# Patient Record
Sex: FEMALE | Race: WHITE | NOT HISPANIC OR LATINO | Employment: FULL TIME | URBAN - METROPOLITAN AREA
[De-identification: names, ages, dates, MRNs, and addresses within clinical notes are randomized per-mention and may not be internally consistent; named-entity substitution may affect disease eponyms.]

---

## 2017-05-16 ENCOUNTER — ALLSCRIPTS OFFICE VISIT (OUTPATIENT)
Dept: OTHER | Facility: OTHER | Age: 42
End: 2017-05-16

## 2017-11-01 DIAGNOSIS — Q61.3 POLYCYSTIC KIDNEY: ICD-10-CM

## 2017-11-02 ENCOUNTER — GENERIC CONVERSION - ENCOUNTER (OUTPATIENT)
Dept: OTHER | Facility: OTHER | Age: 42
End: 2017-11-02

## 2017-12-04 ENCOUNTER — APPOINTMENT (OUTPATIENT)
Dept: RADIOLOGY | Facility: CLINIC | Age: 42
End: 2017-12-04
Payer: COMMERCIAL

## 2017-12-04 ENCOUNTER — TRANSCRIBE ORDERS (OUTPATIENT)
Dept: ADMINISTRATIVE | Facility: HOSPITAL | Age: 42
End: 2017-12-04

## 2017-12-04 ENCOUNTER — APPOINTMENT (OUTPATIENT)
Dept: LAB | Facility: CLINIC | Age: 42
End: 2017-12-04
Payer: COMMERCIAL

## 2017-12-04 ENCOUNTER — TRANSCRIBE ORDERS (OUTPATIENT)
Dept: LAB | Facility: CLINIC | Age: 42
End: 2017-12-04

## 2017-12-04 DIAGNOSIS — N20.0 CALCULUS OF KIDNEY: ICD-10-CM

## 2017-12-04 DIAGNOSIS — Z12.31 VISIT FOR SCREENING MAMMOGRAM: Primary | ICD-10-CM

## 2017-12-04 DIAGNOSIS — Q61.3 POLYCYSTIC KIDNEY: ICD-10-CM

## 2017-12-04 LAB
ANION GAP SERPL CALCULATED.3IONS-SCNC: 6 MMOL/L (ref 4–13)
BUN SERPL-MCNC: 11 MG/DL (ref 5–25)
CALCIUM SERPL-MCNC: 9 MG/DL (ref 8.3–10.1)
CHLORIDE SERPL-SCNC: 105 MMOL/L (ref 100–108)
CO2 SERPL-SCNC: 27 MMOL/L (ref 21–32)
CREAT SERPL-MCNC: 0.7 MG/DL (ref 0.6–1.3)
GFR SERPL CREATININE-BSD FRML MDRD: 106 ML/MIN/1.73SQ M
GLUCOSE P FAST SERPL-MCNC: 104 MG/DL (ref 65–99)
POTASSIUM SERPL-SCNC: 4.9 MMOL/L (ref 3.5–5.3)
SODIUM SERPL-SCNC: 138 MMOL/L (ref 136–145)

## 2017-12-04 PROCEDURE — 80048 BASIC METABOLIC PNL TOTAL CA: CPT

## 2017-12-04 PROCEDURE — 36415 COLL VENOUS BLD VENIPUNCTURE: CPT

## 2017-12-04 PROCEDURE — 74000 HB X-RAY EXAM OF ABDOMEN (SINGLE ANTEROPOSTERIOR VIEW): CPT

## 2017-12-08 ENCOUNTER — GENERIC CONVERSION - ENCOUNTER (OUTPATIENT)
Dept: OTHER | Facility: OTHER | Age: 42
End: 2017-12-08

## 2017-12-08 ENCOUNTER — HOSPITAL ENCOUNTER (OUTPATIENT)
Dept: MAMMOGRAPHY | Facility: HOSPITAL | Age: 42
Discharge: HOME | End: 2017-12-08
Attending: NURSE PRACTITIONER
Payer: COMMERCIAL

## 2017-12-08 DIAGNOSIS — Z12.31 VISIT FOR SCREENING MAMMOGRAM: ICD-10-CM

## 2017-12-08 PROCEDURE — G0202 SCR MAMMO BI INCL CAD: HCPCS

## 2017-12-19 ENCOUNTER — ALLSCRIPTS OFFICE VISIT (OUTPATIENT)
Dept: OTHER | Facility: OTHER | Age: 42
End: 2017-12-19

## 2017-12-19 ENCOUNTER — LAB REQUISITION (OUTPATIENT)
Dept: LAB | Facility: HOSPITAL | Age: 42
End: 2017-12-19
Payer: COMMERCIAL

## 2017-12-19 DIAGNOSIS — Z01.419 ENCOUNTER FOR GYNECOLOGICAL EXAMINATION WITHOUT ABNORMAL FINDING: ICD-10-CM

## 2017-12-19 DIAGNOSIS — R92.8 OTHER ABNORMAL AND INCONCLUSIVE FINDINGS ON DIAGNOSTIC IMAGING OF BREAST: ICD-10-CM

## 2017-12-19 PROCEDURE — 87624 HPV HI-RISK TYP POOLED RSLT: CPT | Performed by: NURSE PRACTITIONER

## 2017-12-19 PROCEDURE — G0145 SCR C/V CYTO,THINLAYER,RESCR: HCPCS | Performed by: NURSE PRACTITIONER

## 2017-12-20 ENCOUNTER — HOSPITAL ENCOUNTER (OUTPATIENT)
Dept: RADIOLOGY | Facility: HOSPITAL | Age: 42
Discharge: HOME | End: 2017-12-20
Attending: NURSE PRACTITIONER
Payer: COMMERCIAL

## 2017-12-20 DIAGNOSIS — R92.8 OTHER ABNORMAL AND INCONCLUSIVE FINDINGS ON DIAGNOSTIC IMAGING OF BREAST: ICD-10-CM

## 2017-12-20 PROCEDURE — 76642 ULTRASOUND BREAST LIMITED: CPT

## 2017-12-20 NOTE — PROGRESS NOTES
Assessment  1  Nephrolithiasis (592 0) (N20 0)   2  Benign hypertension (401 1) (I10)   3  Polycystic kidney (753 12) (Q61 3)    Plan  Benign hypertension, Polycystic kidney    · (1) LIPID PANEL, FASTING; Status:Active; Requested AIX:[id number]; Perform:[facility]; FJJ:87GVA4063;FNONBCS; For:Benign hypertension, Polycystic kidney; Ordered By:Masoud Medel;  Polycystic kidney    · (1) BASIC METABOLIC PROFILE; Status:Active; Requested XQQ:[id number]; Perform:[facility]; HXP:33KFN1857;TZZJMZB; For:Polycystic kidney; Ordered By:Masoud Medel;   · (1) CBC/PLT/DIFF; Status:Active; Requested IOO:[id number]; Perform:[facility]; JPT:32ZHB2420;MZEJOEQ; For:Polycystic kidney; Ordered By:Masoud Medel;    As we reviewed your labs including year creatinine for the kidney function electrolytes are all normal so there is no evidence of damage to your kidneys for in terms of their function  Your blood pressure is excellent continue current medications  Your x-ray showed that there was just 1 3 mm stone which was present in the past there has been no new stone development  Also the other tiny ones must the past as you recall urinating some gravel  Labs and follow-up as scheduled  Discussion/Summary    The patient has history of polycystic kidneys but her creatinine is actually 0 7 electrolytes are normal so there is no signs of renal dysfunction  Her blood pressure is excellent explained to her that most people with polycystic kidney disease due developed hypertension  She is very careful with what she eats and watches her salt intake  Continue her current ARB at the current dose  For now she has no other sequelae related to polycystic kidney disease except a history of kidney stones  We did do a KUB and it just shows 1 3 mm stone which was present in the past so there has been no new development of stones and she has no symptoms    The past we did do a 24 hour urine collection there were really no identifiable risk factors to modify with treatment so I would only really pursue that if she developed new stones  Next visit I will check a CBC, BMP and lipid profile  Reason For Visit  Your here for follow up to monitor your kidney function and blood pressure and kidney stone prevention  History of Present Illness  The patient is here for follow-up as she has a history of polycystic kidney disease, hypertension and kidney stones  She is really doing well and looks well tolerating her medication she has had no intercurrent illnesses no kidney stone event  Overall she has good energy she is working a little bit of stress at home but no other c      Review of Systems   Constitutional: no fever,-- no chills,-- no anorexia-- and-- no fatigue  Integumentary: no rashes  Gastrointestinal: no abdominal pain,-- no nausea,-- no diarrhea-- and-- no vomiting  Respiratory: no shortness of breath,-- no cough-- and-- not coughing up sputum  Cardiovascular: no orthopnea,-- no chest pain,-- no palpitations-- and-- no lower extremity edema  Musculoskeletal: No complaints of joint pain or swelling  Neurological: No complaints of headache, no lightheadedness or dizziness  Genitourinary: no dysuria,-- no hematuria-- and-- no incomplete emptying of bladder  Eyes: No complaints of eyesight problems or dryness of eyes  ENT: no complaints of hearing loss, no nasal discharge  Current Meds   1  Olmesartan Medoxomil 5 MG Oral Tablet; 2 TABS DAILY; Therapy: 61SVS4829 to (Evaluate:12Nov2017)  Requested for: 76UXP8805; Last Rx:42Yxy9799 Ordered    The medication list was reviewed and updated today  Allergies  1   No Known Drug Allergies    Vitals  Vital Signs    Recorded: 94VWR8808 09:12AM Recorded: 94VZE6336 08:37AM   Heart Rate 70    Respiration 16    Systolic 555    Diastolic 70    Height  5 ft 7 in   Weight  170 lb    BMI Calculated  26 63   BSA Calculated  1 89     Physical Exam Constitutional: General appearance: No acute distress, well appearing and well nourished  ENT: External ears and nose appear normal     Eyes: Anicteric sclerae  JVD:  No JVD present  Pulmonary: Respiratory effort: No increased work of breathing or signs of respiratory distress  -- Auscultation of lungs: Clear to auscultation  Cardiovascular: Auscultation of heart: Normal rate and rhythm, normal S1 and S2, without murmurs  Abdomen: Non-tender, no masses  Extremities: No cyanosis, clubbing or edema  Neurologic: Non Focal     Psychiatric: Orientation to person, place, and time: Normal        Results/Data  * MAMMO SCREENING BILATERAL W CAD [id number] 07:23AM EPIC, Provider     Test Name Result Flag Reference   MAMMO SCREENING BILATERAL W CAD (Report)     Patient History:  No known family history of cancer  Patient has never smoked  Patient's BMI is 25 7  Reason for exam: screening, asymptomatic  Mammo Screening Bilateral W CAD: December 8, 2017 - Check In #:   [de-identified]  Bilateral MLO, CC, and XCCL view(s) were taken  Technologist: NELLA Escalante (NELLA)(M)  No prior studies available for comparison  The breast tissue is extremely dense, potentially limiting the   sensitivity of mammography  Patient risk, included in this   report, assists in determining the appropriate screening regimen   (such as 3-D mammography or the inclusion of automated breast   ultrasound or MRI)  3-D mammography may also remain indicated as   screening  No architectural distortion or suspicious calcifications are   noted in either breast  The skin and nipple contours are within   normal limits  Dominant circumscribed 4 5 cm mass likely   representing cyst is present in the upper outer left breast    Targeted ultrasound recommended for confirmation      Sven Diego BI-RADSï¾® Assessments: BiRad:0 - Incomplete: needs additional   imaging evaluation   A breast health care nurse from our facility will be contacting   the patient regarding the need for additional imaging  Recommendation:  Ultrasound of the left breast   Analyzed by CAD   The patient is scheduled in a reminder system for screening   mammography  8-10% of cancers will be missed on mammography  Management of a   palpable abnormality must be based on clinical grounds  Patients  will be notified of their results via letter from our facility  Accredited by [employer] and FDA  Transcription Location: [facility] [address]: [id number]   Risk Value(s):  Tyrer-Cuzick 10 Year: 1 600%, Tyrer-Cuzick Lifetime: 10 300%,   Myriad Table: 1 5%, MITCH 5 Year: 0 7%, NCI Lifetime: 9 9%  Signed by:  Marga Soulier, MD  12/8/17       Future Appointments    Date/Time Provider Specialty Site   12/19/2017 10:30 AM STEFFANIE Hampton  Obstetrics/Gynecology [address] [facility]     Signatures   Electronically signed by :  BK Johnston ; Dec 19 2017  9:14AM EST                       (Author)

## 2017-12-21 LAB — HPV RRNA GENITAL QL NAA+PROBE: NORMAL

## 2017-12-22 NOTE — PROGRESS NOTES
Assessment   1  Encounter for gynecological examination (V72 31) (Z01 419)   2  Pap smear, as part of routine gynecological examination (V76 2) (Z01 419)   3  Abnormal mammogram of left breast (793 80) (R92 8)   4  Left breast lump (611 72) (N63 20)    Plan   Abnormal mammogram of left breast    · *US BREAST LEFT LIMITED (DIAGNOSTIC); Status:Resulted - Requires Verification;      Done: 52JTX7885 11:25AM   Order Comments:for finding on iaiouxatp69/19/17 Clinical breast exam: + palpable finding for -- 4x3 cm cyst in left breast, smooth and mobile, at 2 oclock; Due:95Cip6677; Ordered; For:Abnormal mammogram of left breast; Ordered By:Mary Ann Macdonald;  Encounter for gynecological examination, Pap smear, as part of routine gynecological    examination    · (1) THIN PREP PAP WITH IMAGING; Status: In Progress - Specimen/Data Collected;      Done: 18MPC2571   Perform:[facility] In Office Collection; Order Comments:Cervical/Endocervical; Due:76Eea4818; Ordered;For:Encounter for gynecological examination, Pap smear, as part of routine gynecological examination; Ordered By:Winnie Macdonald;  Maturation index required? : No  : 12/06/2017  HPV? : Regardless of Interpretation    Discussion/Summary   health maintenance visit healthy adult female Pap test with reflex HPV testing was done today Patient discussion: discussed with the patient  Normal well woman exam   with reflex updated   had screening mammogram, birads 0 on left for cyst  diagnostic US order today  with palpable cystic finding on CBE today-- --4x3 cm, likely c/w findings on mammogram  diagnostic findings then will advise patient further  The patient has the current Goals: Ongoing GYN health  of breast finding on mammo and exam  The patent has the current Barriers: None  Patient is able to Self-Care  The treatment plan was reviewed with the patient/guardian   The patient/guardian understands and agrees with the treatment plan       Self Referrals: No      Chief Complaint   Annual Visit Pt is doing good  would like to discuss mammogram results      History of Present Illness   HPI: Amadou Nichole is a 35 yo NEW PATIENT for yearly gyn exam  is known to me from [facility]  records available for review at this time  pap 2016 normal  hx of abnormal paps are regular condoms; satisfied with method  main concern is her recent mammogram which was done on 17  in left breast requires further evaluation with diagnostic US  GYN , Adult Female [facility]: The patient is being seen for a health maintenance and gynecology evaluation  General Health: The patient's health since the last visit is described as good  Lifestyle:  She does not use tobacco -- She consumes alcohol -- She denies drug use  Reproductive health: the patient is premenopausal--   she reports no menstrual problems  -- she uses contraception  -- she is sexually active  -- condoms  Screening:      Review of Systems   no pelvic pain,-- no vaginal discharge,-- no vaginal odor,-- no nonmenstrual bleeding,-- no vulvar itching,-- periods are regular,-- regular length of periods,-- no dysuria,-- no hematuria,-- no change in urinary frequency-- and-- no feelings of urinary urgency  Constitutional: no fever-- and-- no chills  Breasts: except for + finding on recent mammo, but-- no complaints of breast pain, breast lump or nipple discharge  Gastrointestinal: no complaints of abdominal pain, no constipation, no nausea or diarrhea, no vomiting, no bloody stools  Genitourinary: no complaints of dysuria, no incontinence, no pelvic pain, no dysmenorrhea, no vaginal discharge or abnormal vaginal bleeding  ROS reviewed  OB History   Pregnancy History (Brief):      Prior pregnancies: : 2  Para: 2 (full-term)       Active Problems   1  Back pain (724 5) (M54 9)   2  Benign hypertension (401 1) (I10)   3  Lower back pain (724 2) (M54 5)   4   Nephrolithiasis (592  0) (N20 0)   5  Polycystic kidney (753 12) (Q61 3)    Past Medical History    · History of low back pain (V13 59) (Z87 39)     The active problems and past medical history were reviewed and updated today  Surgical History    · History of  Section     The surgical history was reviewed and updated today  Family History   Mother    · Family history of Kidney cysts  Father    · Family history of essential hypertension (V17 49) (Z82 49)     The family history was reviewed and updated today  Social History    · History of Alcohol use (V49 89) (Z78 9)   · Always uses seat belt   · Daily caffeine consumption, 2-3 servings a day   · Never a smoker   · Social alcohol use (Z78 9)  The social history was reviewed and updated today  Current Meds    1  Olmesartan Medoxomil 5 MG Oral Tablet; 2 TABS DAILY; Therapy: 42THK6977 to (Evaluate:2017)  Requested for: 88GKK3111; Last     Rx:[id number] Ordered    Allergies   1  No Known Drug Allergies    Vitals    Recorded: 21VJJ6297 56:38LM   Systolic 129   Diastolic 80   Height 5 ft 7 in   Weight 167 lb 8 oz   BMI Calculated 26 23   BSA Calculated 1 88   LMP 65IFM6156     Physical Exam        Constitutional      General appearance: No acute distress, well appearing and well nourished  Neck      Neck: Normal, supple, trachea midline, no masses  Thyroid: Normal, no thyromegaly  Pulmonary      Respiratory effort: No increased work of breathing or signs of respiratory distress  Genitourinary      External genitalia: Normal and no lesions appreciated  Vagina: Normal, no lesions or dryness appreciated  Urethra: Normal        Urethral meatus: Normal        Bladder: Normal, soft, non-tender and no prolapse or masses appreciated  Cervix: Normal, no palpable masses  Uterus: Normal, non-tender, not enlarged, and no palpable masses         Adnexa/parametria: Normal, non-tender and no fullness or masses appreciated  Anus, perineum, and rectum: Normal sphincter tone, no masses, and no prolapse  Chest      Breasts: Abnormal  -- Left breast with 4 x 3 cm fluctuant cyst UOQ @ 2 o'clock-- smooth, mobile  Abdomen      Abdomen: Normal, non-tender, and no organomegaly noted  Lymphatic      Palpation of lymph nodes in neck, axillae, groin and/or other locations: No lymphadenopathy or masses noted  Psychiatric      Orientation to person, place, and time: Normal        Mood and affect: Normal        Results/Data   *US BREAST LEFT LIMITED (DIAGNOSTIC) 30Xcd7683 11:25AM Elvira Lovell Order Number: [id number]       - Patient Instructions: To schedule this appointment, please contact Central Scheduling at [phone]  Test Name Result Flag Reference   US BREAST LEFT LIMITED (Report)     Patient History:      No known family history of cancer  Patient has never smoked  Patient's BMI is 25 7  Reason for exam: addl evaluation requested from abnormal       screening  79-year-old female with abnormal mammogram            US Breast Left Limited: December 20, 2017 - Check In #: [de-identified]      Standard views  Technologist: Nusrat Kaplan      Prior study comparison: December 8, 2017, mammo screening       bilateral W CAD, performed at [address]  A uniformly echogenic layer of fibroglandular tissue  At the       3:00 location of the left breast, 5 cm from the nipple, a bilobed      anechoic cyst measures 3 5 x 1 1 x 3 5 cm  The cyst evokes       acoustic enhancement posteriorly with no solid component or       vascularity  This does correspond to the mammographic finding  No solid mass lesions, areas of architectural distortion, or       abnormal acoustic shadowing in the outer breast to suggest       malignancy        3 5 x 1 1 x 3 5 cm bilobed cyst at the 3:00 location       left breast corresponding to the mammographic finding  ACR BI-RADSï¾® Assessments: BiRad:2 - Benign           Recommendation:      Clinical management of the left breast       Routine screening mammogram of both breasts in 1 year  The patient is scheduled in a reminder system for screening       mammography             Transcription Location: [address] [place] [address] Street: [id number]           Risk Value(s):      Tyrer-Cuzick 10 Year: 1 600%, Tyrer-Cuzick Lifetime: 10 300%,       Myriad Table: 1 5%, MITCH 5 Year: 0 7%, NCI Lifetime: 9 9%      Signed by:      Dex Andrews MD      12/20/17        Provider Comments   Provider Comments:    has a 16 and 15 yo      Signatures    Electronically signed by : Kimber Freeman NP; Dec 19 2017  1:33PM EST                       (Author)     Electronically signed by : BK Rascon ; Dec 21 2017 12:50PM EST                       (Author)

## 2017-12-26 LAB
LAB AP GYN PRIMARY INTERPRETATION: NORMAL
LAB AP LMP: NORMAL
Lab: NORMAL

## 2018-01-13 VITALS
HEART RATE: 70 BPM | BODY MASS INDEX: 25.58 KG/M2 | SYSTOLIC BLOOD PRESSURE: 124 MMHG | DIASTOLIC BLOOD PRESSURE: 80 MMHG | WEIGHT: 163 LBS | RESPIRATION RATE: 16 BRPM | HEIGHT: 67 IN

## 2018-01-17 NOTE — MISCELLANEOUS
Message  I called patient to schedule follow up with and she stated that she will call us to schedule  /dl      Active Problems    1  Back pain (724 5) (M54 9)   2  Benign hypertension (401 1) (I10)   3  Lower back pain (724 2) (M54 5)   4  Nephrolithiasis (592 0) (N20 0)   5  Polycystic kidney (753 12) (Q61 3)    Current Meds   1  Olmesartan Medoxomil 5 MG Oral Tablet; 2 TABS DAILY; Therapy: 76IUM1114 to (Evaluate:82Lfj1329)  Requested for: 13OFI9863; Last   Rx:[id number] Ordered    Allergies    1  No Known Drug Allergies    Signatures   Electronically signed by :  BK Mccormick ; Nov 2 2017  2:39PM EST

## 2018-01-23 VITALS
DIASTOLIC BLOOD PRESSURE: 70 MMHG | SYSTOLIC BLOOD PRESSURE: 118 MMHG | BODY MASS INDEX: 26.68 KG/M2 | WEIGHT: 170 LBS | HEART RATE: 70 BPM | HEIGHT: 67 IN | RESPIRATION RATE: 16 BRPM

## 2018-01-23 VITALS
WEIGHT: 167.5 LBS | DIASTOLIC BLOOD PRESSURE: 80 MMHG | SYSTOLIC BLOOD PRESSURE: 120 MMHG | BODY MASS INDEX: 26.29 KG/M2 | HEIGHT: 67 IN

## 2018-01-23 NOTE — CONSULTS
Reason For Visit  Your here for follow up to monitor your kidney function and blood pressure and kidney stone prevention  History of Present Illness  The patient is here for follow-up as she has a history of polycystic kidney disease, hypertension and kidney stones  She is really doing well and looks well tolerating her medication she has had no intercurrent illnesses no kidney stone event  Overall she has good energy she is working a little bit of stress at home but no other c        Review of Systems    Constitutional: no fever, no chills, no anorexia and no fatigue  Integumentary: no rashes  Gastrointestinal: no abdominal pain, no nausea, no diarrhea and no vomiting  Respiratory: no shortness of breath, no cough and not coughing up sputum  Cardiovascular: no orthopnea, no chest pain, no palpitations and no lower extremity edema  Musculoskeletal: No complaints of joint pain or swelling  Neurological: No complaints of headache, no lightheadedness or dizziness  Genitourinary: no dysuria, no hematuria and no incomplete emptying of bladder  Eyes: No complaints of eyesight problems or dryness of eyes  ENT: no complaints of hearing loss, no nasal discharge  Current Meds   1  Olmesartan Medoxomil 5 MG Oral Tablet; 2 TABS DAILY; Therapy: 77KMK7620 to (Evaluate:12Nov2017)  Requested for: 54ZFM4720; Last   Rx:96Jml7875 Ordered    The medication list was reviewed and updated today  Allergies    1  No Known Drug Allergies    Vitals   Recorded: 24SIF6720 09:12AM Recorded: 79YRX5541 08:37AM   Heart Rate 70    Respiration 16    Systolic 355    Diastolic 70    Height  5 ft 7 in   Weight  170 lb    BMI Calculated  26 63   BSA Calculated  1 89     Physical Exam    Constitutional: General appearance: No acute distress, well appearing and well nourished  ENT: External ears and nose appear normal      Eyes: Anicteric sclerae  JVD:  No JVD present     Pulmonary: Respiratory effort: No increased work of breathing or signs of respiratory distress  Auscultation of lungs: Clear to auscultation  Cardiovascular: Auscultation of heart: Normal rate and rhythm, normal S1 and S2, without murmurs  Abdomen: Non-tender, no masses  Extremities: No cyanosis, clubbing or edema  Neurologic: Non Focal      Psychiatric: Orientation to person, place, and time: Normal        Results/Data  * MAMMO SCREENING BILATERAL W CAD [id number] 07:23AM EPIC, Provider     Test Name Result Flag Reference   MAMMO SCREENING BILATERAL W CAD (Report)     Patient History:   No known family history of cancer  Patient has never smoked  Patient's BMI is 25 7  Reason for exam: screening, asymptomatic  Mammo Screening Bilateral W CAD: December 8, 2017 - Check In #:    [de-identified]   Bilateral MLO, CC, and XCCL view(s) were taken  Technologist: NELLA Rodriguez (NELLA)(M)   No prior studies available for comparison  The breast tissue is extremely dense, potentially limiting the    sensitivity of mammography  Patient risk, included in this    report, assists in determining the appropriate screening regimen    (such as 3-D mammography or the inclusion of automated breast    ultrasound or MRI)  3-D mammography may also remain indicated as    screening  No architectural distortion or suspicious calcifications are    noted in either breast  The skin and nipple contours are within    normal limits  Dominant circumscribed 4 5 cm mass likely    representing cyst is present in the upper outer left breast     Targeted ultrasound recommended for confirmation  Toya Le BI-RADSï¾® Assessments: BiRad:0 - Incomplete: needs additional    imaging evaluation     A breast health care nurse from our facility will be contacting    the patient regarding the need for additional imaging       Recommendation:   Ultrasound of the left breast    Analyzed by CAD     The patient is scheduled in a reminder system for screening mammography  8-10% of cancers will be missed on mammography  Management of a    palpable abnormality must be based on clinical grounds  Patients   will be notified of their results via letter from our facility  Accredited by [employer] and FDA  Transcription Location: [facility] [address]: [id number]     Risk Value(s):   Tyrer-Cuzick 10 Year: 1 600%, Tyrer-Cuzick Lifetime: 10 300%,    Myriad Table: 1 5%, MITCH 5 Year: 0 7%, NCI Lifetime: 9 9%   Signed by:   Vishal Tejeda MD   12/8/17       Assessment    1  Nephrolithiasis (592 0) (N20 0)   2  Benign hypertension (401 1) (I10)   3  Polycystic kidney (753 12) (Q61 3)    Plan  Benign hypertension, Polycystic kidney    · (1) LIPID PANEL, FASTING; Status:Active; Requested MIKAELA:[id number]; Perform:[facility]; RMR:[id number];XGOIKRW; For:Benign hypertension, Polycystic kidney; Ordered By:Masoud Medel;  Polycystic kidney    · (1) BASIC METABOLIC PROFILE; Status:Active; Requested FVZ:[id number]; Perform:[facility]; WHO:14XZS6425;CLYIPUJ; For:Polycystic kidney; Ordered By:Masoud Medel;   · (1) CBC/PLT/DIFF; Status:Active; Requested ZGA:[id number]; Perform:[facility]; CPI:[id number];BHHCBXT; For:Polycystic kidney; Ordered By:Masoud Medel;      As we reviewed your labs including year creatinine for the kidney function electrolytes are all normal so there is no evidence of damage to your kidneys for in terms of their function  Your blood pressure is excellent continue current medications  Your x-ray showed that there was just 1 3 mm stone which was present in the past there has been no new stone development  Also the other tiny ones must the past as you recall urinating some gravel  Labs and follow-up as scheduled  Discussion/Summary    The patient has history of polycystic kidneys but her creatinine is actually 0 7 electrolytes are normal so there is no signs of renal dysfunction    Her blood pressure is excellent explained to her that most people with polycystic kidney disease due developed hypertension  She is very careful with what she eats and watches her salt intake  Continue her current ARB at the current dose  For now she has no other sequelae related to polycystic kidney disease except a history of kidney stones  We did do a KUB and it just shows 1 3 mm stone which was present in the past so there has been no new development of stones and she has no symptoms  The past we did do a 24 hour urine collection there were really no identifiable risk factors to modify with treatment so I would only really pursue that if she developed new stones  Next visit I will check a CBC, BMP and lipid profile  Future Appointments    Signatures   Electronically signed by :  BK Green ; Dec 19 2017  9:14AM EST                       (Author)

## 2018-06-01 DIAGNOSIS — Q61.3 POLYCYSTIC KIDNEY: ICD-10-CM

## 2018-06-01 DIAGNOSIS — I10 ESSENTIAL (PRIMARY) HYPERTENSION: ICD-10-CM

## 2018-08-08 DIAGNOSIS — I10 ESSENTIAL HYPERTENSION: Primary | ICD-10-CM

## 2018-08-08 RX ORDER — OLMESARTAN MEDOXOMIL 5 MG/1
TABLET ORAL
Qty: 60 TABLET | Refills: 5 | Status: SHIPPED | OUTPATIENT
Start: 2018-08-08 | End: 2019-06-05 | Stop reason: SDUPTHER

## 2018-10-17 ENCOUNTER — TELEPHONE (OUTPATIENT)
Dept: NEPHROLOGY | Facility: CLINIC | Age: 43
End: 2018-10-17

## 2018-10-17 NOTE — TELEPHONE ENCOUNTER
Spoke with the patient and told her to call the pharmacy to double check medication and she is going to be calling them

## 2018-10-17 NOTE — TELEPHONE ENCOUNTER
Patient called the office regarding er medication she had recently been refilled by Dr Medel in August, Olmesartan (Benicar) 5 mg 2 tablets daily  Patient wanted to know if it was the same medication she has been on due to the shape and color change  I advised patient that the medication can change due to different manufacture and is they only medication we have listed for her  I noticed patient has not been seen since 2017 and I offered her to schedule a follow up appointment, patient declined said she would call us back to schedule

## 2019-06-05 DIAGNOSIS — I10 ESSENTIAL HYPERTENSION: ICD-10-CM

## 2019-06-05 RX ORDER — OLMESARTAN MEDOXOMIL 5 MG/1
10 TABLET ORAL DAILY
Qty: 60 TABLET | Refills: 5 | Status: SHIPPED | OUTPATIENT
Start: 2019-06-05 | End: 2020-10-27

## 2019-06-24 ENCOUNTER — TELEPHONE (OUTPATIENT)
Dept: NEPHROLOGY | Facility: CLINIC | Age: 44
End: 2019-06-24

## 2019-06-24 DIAGNOSIS — I10 ESSENTIAL HYPERTENSION: Primary | ICD-10-CM

## 2019-06-24 DIAGNOSIS — Q61.3 POLYCYSTIC KIDNEY: ICD-10-CM

## 2019-06-24 DIAGNOSIS — I10 ESSENTIAL (PRIMARY) HYPERTENSION: ICD-10-CM

## 2019-06-28 ENCOUNTER — APPOINTMENT (OUTPATIENT)
Dept: LAB | Facility: CLINIC | Age: 44
End: 2019-06-28
Payer: COMMERCIAL

## 2019-06-28 DIAGNOSIS — I10 ESSENTIAL (PRIMARY) HYPERTENSION: ICD-10-CM

## 2019-06-28 DIAGNOSIS — I10 ESSENTIAL HYPERTENSION: ICD-10-CM

## 2019-06-28 DIAGNOSIS — Q61.3 POLYCYSTIC KIDNEY: ICD-10-CM

## 2019-06-28 LAB
ANION GAP SERPL CALCULATED.3IONS-SCNC: 6 MMOL/L (ref 4–13)
BUN SERPL-MCNC: 12 MG/DL (ref 5–25)
CALCIUM SERPL-MCNC: 9 MG/DL (ref 8.3–10.1)
CHLORIDE SERPL-SCNC: 108 MMOL/L (ref 100–108)
CHOLEST SERPL-MCNC: 176 MG/DL (ref 50–200)
CO2 SERPL-SCNC: 24 MMOL/L (ref 21–32)
CREAT SERPL-MCNC: 0.71 MG/DL (ref 0.6–1.3)
ERYTHROCYTE [DISTWIDTH] IN BLOOD BY AUTOMATED COUNT: 13.4 % (ref 11.6–15.1)
GFR SERPL CREATININE-BSD FRML MDRD: 104 ML/MIN/1.73SQ M
GLUCOSE P FAST SERPL-MCNC: 94 MG/DL (ref 65–99)
HCT VFR BLD AUTO: 38.9 % (ref 34.8–46.1)
HDLC SERPL-MCNC: 80 MG/DL (ref 40–60)
HGB BLD-MCNC: 12.4 G/DL (ref 11.5–15.4)
LDLC SERPL CALC-MCNC: 83 MG/DL (ref 0–100)
MCH RBC QN AUTO: 30.2 PG (ref 26.8–34.3)
MCHC RBC AUTO-ENTMCNC: 31.9 G/DL (ref 31.4–37.4)
MCV RBC AUTO: 95 FL (ref 82–98)
NONHDLC SERPL-MCNC: 96 MG/DL
PLATELET # BLD AUTO: 278 THOUSANDS/UL (ref 149–390)
PMV BLD AUTO: 9.7 FL (ref 8.9–12.7)
POTASSIUM SERPL-SCNC: 4.4 MMOL/L (ref 3.5–5.3)
RBC # BLD AUTO: 4.11 MILLION/UL (ref 3.81–5.12)
SODIUM SERPL-SCNC: 138 MMOL/L (ref 136–145)
TRIGL SERPL-MCNC: 65 MG/DL
WBC # BLD AUTO: 5.65 THOUSAND/UL (ref 4.31–10.16)

## 2019-06-28 PROCEDURE — 80048 BASIC METABOLIC PNL TOTAL CA: CPT

## 2019-06-28 PROCEDURE — 36415 COLL VENOUS BLD VENIPUNCTURE: CPT

## 2019-06-28 PROCEDURE — 85027 COMPLETE CBC AUTOMATED: CPT

## 2019-06-28 PROCEDURE — 80061 LIPID PANEL: CPT

## 2019-07-11 ENCOUNTER — OFFICE VISIT (OUTPATIENT)
Dept: NEPHROLOGY | Facility: CLINIC | Age: 44
End: 2019-07-11
Payer: COMMERCIAL

## 2019-07-11 VITALS
HEART RATE: 70 BPM | WEIGHT: 157 LBS | SYSTOLIC BLOOD PRESSURE: 120 MMHG | DIASTOLIC BLOOD PRESSURE: 80 MMHG | HEIGHT: 67 IN | BODY MASS INDEX: 24.64 KG/M2

## 2019-07-11 DIAGNOSIS — N20.0 NEPHROLITHIASIS: ICD-10-CM

## 2019-07-11 DIAGNOSIS — Q61.3 POLYCYSTIC KIDNEY DISEASE: Primary | ICD-10-CM

## 2019-07-11 DIAGNOSIS — I10 HYPERTENSION, UNSPECIFIED TYPE: ICD-10-CM

## 2019-07-11 PROCEDURE — 3074F SYST BP LT 130 MM HG: CPT | Performed by: INTERNAL MEDICINE

## 2019-07-11 PROCEDURE — 99214 OFFICE O/P EST MOD 30 MIN: CPT | Performed by: INTERNAL MEDICINE

## 2019-07-11 NOTE — PROGRESS NOTES
NEPHROLOGY PROGRESS NOTE    Radha Ruiz 38 y o  female MRN: [id number]  Unit/Bed#:  Encounter: [id number]  Reason for Consult:  Polycystic kidney disease and nephrolithiasis    The patient is here for her yearly follow-up  She has been feeling well and has had no intercurrent illnesses no clinical stone event although she may have passed some gravel and she is tolerating her medication without any side effects  ASSESSMENT/PLAN:  1  Renal  The patient is chronic kidney disease due to polycystic kidney  Her creatinine is 0 7 with normal electrolytes and well controlled blood pressure on her ARB  At this point there is no blood evidence on her lab work of any progression of her kidney disease so overall she is doing very well and is stable  She recently did inform me that her mother has progressive kidney disease at the age 72 and is being evaluated for kidney transplant  Other than that I reviewed her lipid profile was excellent her hemoglobin is normal and her renal function electrolytes are normal as well  Continue current medications and follow-up yearly  I told her to call me if there is any problems before next  Visit  2  Nephrolithiasis  Patient is history of kidney stones and she has been having occasional discomfort and may be passing gravel so I am going to order another CT scan with the renal protocol to assess for any progression of her stone burden if that is present we will work it up to see if there is measures we need to medically initiate to prevent development of kidney stones  She will be scheduling the CT scan as she knows her schedule and I will call her when the results come in     3  Hypertension  Blood pressure well controlled on current therapy on 1 medication no changes  SUBJECTIVE:  Review of Systems   Constitution: Negative for chills and fever  HENT: Negative  Cardiovascular: Negative  Negative for chest pain, dyspnea on exertion, leg swelling and orthopnea  Respiratory: Negative  Negative for cough, shortness of breath and wheezing  Gastrointestinal: Negative  Negative for abdominal pain, diarrhea, nausea and vomiting  Genitourinary: Negative for dysuria and hematuria  Neurological: Negative  Psychiatric/Behavioral: Negative for altered mental status  OBJECTIVE:  Current Weight: Weight - Scale: 71 2 kg (157 lb)  [email or web address]:     Blood pressure 120/80, pulse 70, height 5' 7" (1 702 m), weight 71 2 kg (157 lb)  , Body mass index is 24 59 kg/m²  [unfilled]    Physical Exam: /80 (BP Location: Right arm, Patient Position: Sitting, Cuff Size: Standard)   Pulse 70   Ht 5' 7" (1 702 m)   Wt 71 2 kg (157 lb)   BMI 24 59 kg/m²   Physical Exam   Constitutional: She is oriented to person, place, and time  She appears well-nourished  No distress  HENT:   Head: Atraumatic  Neck: Neck supple  No JVD present  Cardiovascular: Normal rate and regular rhythm  Exam reveals no friction rub  No edema  Pulmonary/Chest: Effort normal and breath sounds normal  No respiratory distress  She has no wheezes  She has no rales  Abdominal: Soft  Bowel sounds are normal  She exhibits no distension  There is no tenderness  Neurological: She is alert and oriented to person, place, and time  Psychiatric: She has a normal mood and affect         Medications:    Current Outpatient Medications:     olmesartan (BENICAR) 5 mg tablet, Take 2 tablets (10 mg total) by mouth daily, Disp: 60 tablet, Rfl: 5    Laboratory Results:  Lab Results   Component Value Date    WBC 5 65 06/28/2019    HGB 12 4 06/28/2019    HCT 38 9 06/28/2019    MCV 95 06/28/2019     06/28/2019     Lab Results   Component Value Date    SODIUM 138 06/28/2019    K 4 4 06/28/2019     06/28/2019    CO2 24 06/28/2019    BUN 12 06/28/2019    CREATININE 0 71 06/28/2019    GLUC 83 07/11/2016    CALCIUM 9 0 06/28/2019     Lab Results   Component Value Date    CALCIUM 9 0 06/28/2019     No results found for: LABPROT

## 2019-07-11 NOTE — PATIENT INSTRUCTIONS
You are here for follow-up and say that your health been doing good for a year  Your creatinine is 0 7 which is the blood test for the kidney function so it is still normal and there has been no worsening in the lab work  We reviewed your cholesterol profile which is excellent your hemoglobin is excellent  You do have a history of kidney stones and we are going to repeat a CT scan to see if there has been any progression of the stone disease  I will contact you when these results come to my office  Labs and follow-up in 1 year unless you need to see me sooner in please call if there is any questions or problems

## 2019-07-11 NOTE — LETTER
July 11, 2019     Catalina Connell MD  [address] [address]    Patient: Rosaline Ballard   YOB: 1975   Date of Visit: 7/11/2019       Dear Dr Madison Godwin: Thank you for referring Rosaline Ballard to me for evaluation  Below are my notes for this consultation  If you have questions, please do not hesitate to call me  I look forward to following your patient along with you  Sincerely,        Renae Shell MD        CC: No Recipients  Renae Shell MD  7/11/2019  5:29 PM  Sign at close encounter  [address] [address] 38 y o  female MRN: [id number]  Unit/Bed#:  Encounter: [id number]  Reason for Consult:  Polycystic kidney disease and nephrolithiasis    The patient is here for her yearly follow-up  She has been feeling well and has had no intercurrent illnesses no clinical stone event although she may have passed some gravel and she is tolerating her medication without any side effects  ASSESSMENT/PLAN:  1  Renal  The patient is chronic kidney disease due to polycystic kidney  Her creatinine is 0 7 with normal electrolytes and well controlled blood pressure on her ARB  At this point there is no blood evidence on her lab work of any progression of her kidney disease so overall she is doing very well and is stable  She recently did inform me that her mother has progressive kidney disease at the age 72 and is being evaluated for kidney transplant  Other than that I reviewed her lipid profile was excellent her hemoglobin is normal and her renal function electrolytes are normal as well  Continue current medications and follow-up yearly  I told her to call me if there is any problems before next  Visit    2  Nephrolithiasis  Patient is history of kidney stones and she has been having occasional discomfort and may be passing gravel so I am going to order another CT scan with the renal protocol to assess for any progression of her stone burden if that is present we will work it up to see if there is measures we need to medically initiate to prevent development of kidney stones  She will be scheduling the CT scan as she knows her schedule and I will call her when the results come in     3  Hypertension  Blood pressure well controlled on current therapy on 1 medication no changes  SUBJECTIVE:  Review of Systems   Constitution: Negative for chills and fever  HENT: Negative  Cardiovascular: Negative  Negative for chest pain, dyspnea on exertion, leg swelling and orthopnea  Respiratory: Negative  Negative for cough, shortness of breath and wheezing  Gastrointestinal: Negative  Negative for abdominal pain, diarrhea, nausea and vomiting  Genitourinary: Negative for dysuria and hematuria  Neurological: Negative  Psychiatric/Behavioral: Negative for altered mental status  OBJECTIVE:  Current Weight: Weight - Scale: 71 2 kg (157 lb)  [email or web address]     Blood pressure 120/80, pulse 70, height 5' 7" (1 702 m), weight 71 2 kg (157 lb)  , Body mass index is 24 59 kg/m²  [unfilled]    Physical Exam: /80 (BP Location: Right arm, Patient Position: Sitting, Cuff Size: Standard)   Pulse 70   Ht 5' 7" (1 702 m)   Wt 71 2 kg (157 lb)   BMI 24 59 kg/m²    Physical Exam   Constitutional: She is oriented to person, place, and time  She appears well-nourished  No distress  HENT:   Head: Atraumatic  Neck: Neck supple  No JVD present  Cardiovascular: Normal rate and regular rhythm  Exam reveals no friction rub  No edema  Pulmonary/Chest: Effort normal and breath sounds normal  No respiratory distress  She has no wheezes  She has no rales  Abdominal: Soft  Bowel sounds are normal  She exhibits no distension  There is no tenderness  Neurological: She is alert and oriented to person, place, and time  Psychiatric: She has a normal mood and affect         Medications:    Current Outpatient Medications:    olmesartan (BENICAR) 5 mg tablet, Take 2 tablets (10 mg total) by mouth daily, Disp: 60 tablet, Rfl: 5    Laboratory Results:  Lab Results   Component Value Date    WBC 5 65 06/28/2019    HGB 12 4 06/28/2019    HCT 38 9 06/28/2019    MCV 95 06/28/2019     06/28/2019     Lab Results   Component Value Date    SODIUM 138 06/28/2019    K 4 4 06/28/2019     06/28/2019    CO2 24 06/28/2019    BUN 12 06/28/2019    CREATININE 0 71 06/28/2019    GLUC 83 07/11/2016    CALCIUM 9 0 06/28/2019     Lab Results   Component Value Date    CALCIUM 9 0 06/28/2019     No results found for: LABPROT

## 2020-04-02 ENCOUNTER — TELEPHONE (OUTPATIENT)
Dept: NEPHROLOGY | Facility: CLINIC | Age: 45
End: 2020-04-02

## 2020-04-14 ENCOUNTER — TELEPHONE (OUTPATIENT)
Dept: NEPHROLOGY | Facility: CLINIC | Age: 45
End: 2020-04-14

## 2020-05-29 ENCOUNTER — OFFICE VISIT (OUTPATIENT)
Dept: URGENT CARE | Facility: CLINIC | Age: 45
End: 2020-05-29
Payer: COMMERCIAL

## 2020-05-29 VITALS
DIASTOLIC BLOOD PRESSURE: 66 MMHG | HEART RATE: 75 BPM | WEIGHT: 166 LBS | HEIGHT: 67 IN | RESPIRATION RATE: 16 BRPM | BODY MASS INDEX: 26.06 KG/M2 | SYSTOLIC BLOOD PRESSURE: 119 MMHG | TEMPERATURE: 98.5 F

## 2020-05-29 DIAGNOSIS — M62.838 NECK MUSCLE SPASM: Primary | ICD-10-CM

## 2020-05-29 PROCEDURE — 99213 OFFICE O/P EST LOW 20 MIN: CPT | Performed by: PHYSICIAN ASSISTANT

## 2020-05-29 RX ORDER — METAXALONE 800 MG/1
800 TABLET ORAL 3 TIMES DAILY PRN
Qty: 12 TABLET | Refills: 0 | Status: SHIPPED | OUTPATIENT
Start: 2020-05-29 | End: 2020-10-06 | Stop reason: ALTCHOICE

## 2020-06-02 ENCOUNTER — TELEPHONE (OUTPATIENT)
Dept: NEPHROLOGY | Facility: CLINIC | Age: 45
End: 2020-06-02

## 2020-06-11 DIAGNOSIS — E55.9 VITAMIN D DEFICIENCY: Primary | ICD-10-CM

## 2020-06-11 RX ORDER — ERGOCALCIFEROL 1.25 MG/1
CAPSULE ORAL
COMMUNITY
Start: 2020-03-20 | End: 2020-10-19 | Stop reason: SDUPTHER

## 2020-06-11 RX ORDER — ERGOCALCIFEROL 1.25 MG/1
CAPSULE ORAL
Qty: 12 CAPSULE | OUTPATIENT
Start: 2020-06-11

## 2020-07-21 ENCOUNTER — TELEPHONE (OUTPATIENT)
Dept: NEPHROLOGY | Facility: CLINIC | Age: 45
End: 2020-07-21

## 2020-07-21 NOTE — TELEPHONE ENCOUNTER
A message was left requesting patient call the office back to schedule August follow up with provider

## 2020-10-06 ENCOUNTER — OFFICE VISIT (OUTPATIENT)
Dept: FAMILY MEDICINE CLINIC | Facility: CLINIC | Age: 45
End: 2020-10-06
Payer: COMMERCIAL

## 2020-10-06 VITALS
OXYGEN SATURATION: 98 % | SYSTOLIC BLOOD PRESSURE: 118 MMHG | WEIGHT: 169 LBS | BODY MASS INDEX: 26.53 KG/M2 | DIASTOLIC BLOOD PRESSURE: 72 MMHG | TEMPERATURE: 97.3 F | HEART RATE: 65 BPM | HEIGHT: 67 IN | RESPIRATION RATE: 16 BRPM

## 2020-10-06 DIAGNOSIS — Q61.3 POLYCYSTIC KIDNEY DISEASE: Primary | ICD-10-CM

## 2020-10-06 DIAGNOSIS — Z12.31 ENCOUNTER FOR SCREENING MAMMOGRAM FOR BREAST CANCER: ICD-10-CM

## 2020-10-06 DIAGNOSIS — I10 HYPERTENSION, UNSPECIFIED TYPE: ICD-10-CM

## 2020-10-06 DIAGNOSIS — E53.8 B12 DEFICIENCY: ICD-10-CM

## 2020-10-06 DIAGNOSIS — E55.9 VITAMIN D DEFICIENCY: ICD-10-CM

## 2020-10-06 PROCEDURE — 99214 OFFICE O/P EST MOD 30 MIN: CPT | Performed by: FAMILY MEDICINE

## 2020-10-06 PROCEDURE — 3074F SYST BP LT 130 MM HG: CPT | Performed by: FAMILY MEDICINE

## 2020-10-06 PROCEDURE — 1036F TOBACCO NON-USER: CPT | Performed by: FAMILY MEDICINE

## 2020-10-06 PROCEDURE — 3078F DIAST BP <80 MM HG: CPT | Performed by: FAMILY MEDICINE

## 2020-10-06 RX ORDER — TRAMADOL HYDROCHLORIDE 50 MG/1
TABLET ORAL
COMMUNITY
Start: 2020-09-08 | End: 2020-10-06 | Stop reason: ALTCHOICE

## 2020-10-19 ENCOUNTER — TRANSCRIBE ORDERS (OUTPATIENT)
Dept: LAB | Facility: CLINIC | Age: 45
End: 2020-10-19

## 2020-10-19 ENCOUNTER — LAB (OUTPATIENT)
Dept: LAB | Facility: CLINIC | Age: 45
End: 2020-10-19
Payer: COMMERCIAL

## 2020-10-19 DIAGNOSIS — E55.9 VITAMIN D DEFICIENCY: Primary | ICD-10-CM

## 2020-10-19 DIAGNOSIS — Q61.3 POLYCYSTIC KIDNEY DISEASE: ICD-10-CM

## 2020-10-19 DIAGNOSIS — E55.9 VITAMIN D DEFICIENCY: ICD-10-CM

## 2020-10-19 DIAGNOSIS — E53.8 B12 DEFICIENCY: ICD-10-CM

## 2020-10-19 DIAGNOSIS — I10 HYPERTENSION, UNSPECIFIED TYPE: ICD-10-CM

## 2020-10-19 LAB
25(OH)D3 SERPL-MCNC: 17.9 NG/ML (ref 30–100)
ANION GAP SERPL CALCULATED.3IONS-SCNC: 5 MMOL/L (ref 4–13)
BUN SERPL-MCNC: 18 MG/DL (ref 5–25)
CALCIUM SERPL-MCNC: 8.6 MG/DL (ref 8.3–10.1)
CHLORIDE SERPL-SCNC: 107 MMOL/L (ref 100–108)
CO2 SERPL-SCNC: 25 MMOL/L (ref 21–32)
CREAT SERPL-MCNC: 0.83 MG/DL (ref 0.6–1.3)
GFR SERPL CREATININE-BSD FRML MDRD: 85 ML/MIN/1.73SQ M
GLUCOSE P FAST SERPL-MCNC: 82 MG/DL (ref 65–99)
POTASSIUM SERPL-SCNC: 4.4 MMOL/L (ref 3.5–5.3)
SODIUM SERPL-SCNC: 137 MMOL/L (ref 136–145)
VIT B12 SERPL-MCNC: 456 PG/ML (ref 100–900)

## 2020-10-19 PROCEDURE — 82306 VITAMIN D 25 HYDROXY: CPT

## 2020-10-19 PROCEDURE — 80048 BASIC METABOLIC PNL TOTAL CA: CPT

## 2020-10-19 PROCEDURE — 36415 COLL VENOUS BLD VENIPUNCTURE: CPT

## 2020-10-19 PROCEDURE — 82607 VITAMIN B-12: CPT

## 2020-10-19 RX ORDER — ERGOCALCIFEROL 1.25 MG/1
50000 CAPSULE ORAL WEEKLY
Qty: 12 CAPSULE | Refills: 1 | Status: SHIPPED | OUTPATIENT
Start: 2020-10-19 | End: 2021-11-02 | Stop reason: ALTCHOICE

## 2020-10-26 DIAGNOSIS — I10 ESSENTIAL HYPERTENSION: ICD-10-CM

## 2020-10-27 RX ORDER — OLMESARTAN MEDOXOMIL 5 MG/1
TABLET ORAL
Qty: 180 TABLET | Refills: 1 | Status: SHIPPED | OUTPATIENT
Start: 2020-10-27 | End: 2021-06-04 | Stop reason: SDUPTHER

## 2020-11-16 ENCOUNTER — TELEPHONE (OUTPATIENT)
Dept: NEPHROLOGY | Facility: CLINIC | Age: 45
End: 2020-11-16

## 2020-11-19 ENCOUNTER — HOSPITAL ENCOUNTER (OUTPATIENT)
Dept: MAMMOGRAPHY | Facility: HOSPITAL | Age: 45
Discharge: HOME | End: 2020-11-19
Attending: FAMILY MEDICINE
Payer: COMMERCIAL

## 2020-11-19 VITALS — HEIGHT: 67 IN | WEIGHT: 169 LBS | BODY MASS INDEX: 26.53 KG/M2

## 2020-11-19 DIAGNOSIS — Z12.31 ENCOUNTER FOR SCREENING MAMMOGRAM FOR BREAST CANCER: ICD-10-CM

## 2020-11-19 PROCEDURE — 77067 SCR MAMMO BI INCL CAD: CPT

## 2020-11-19 PROCEDURE — 77063 BREAST TOMOSYNTHESIS BI: CPT

## 2020-11-20 DIAGNOSIS — N20.0 NEPHROLITHIASIS: ICD-10-CM

## 2020-11-20 DIAGNOSIS — I10 HYPERTENSION, UNSPECIFIED TYPE: ICD-10-CM

## 2020-11-20 DIAGNOSIS — Q61.3 POLYCYSTIC KIDNEY DISEASE: Primary | ICD-10-CM

## 2020-11-20 DIAGNOSIS — Q61.3 POLYCYSTIC KIDNEY: ICD-10-CM

## 2020-11-23 ENCOUNTER — TELEPHONE (OUTPATIENT)
Dept: FAMILY MEDICINE CLINIC | Facility: CLINIC | Age: 45
End: 2020-11-23

## 2020-11-27 ENCOUNTER — HOSPITAL ENCOUNTER (OUTPATIENT)
Dept: RADIOLOGY | Facility: HOSPITAL | Age: 45
Discharge: HOME | End: 2020-11-27
Attending: INTERNAL MEDICINE
Payer: COMMERCIAL

## 2020-11-27 DIAGNOSIS — Q61.3 POLYCYSTIC KIDNEY DISEASE: ICD-10-CM

## 2020-11-27 DIAGNOSIS — Q61.3 POLYCYSTIC KIDNEY: ICD-10-CM

## 2020-11-27 DIAGNOSIS — I10 HYPERTENSION, UNSPECIFIED TYPE: ICD-10-CM

## 2020-11-27 DIAGNOSIS — N20.0 NEPHROLITHIASIS: ICD-10-CM

## 2020-11-27 PROCEDURE — 74176 CT ABD & PELVIS W/O CONTRAST: CPT

## 2020-11-27 PROCEDURE — G1004 CDSM NDSC: HCPCS

## 2020-11-30 ENCOUNTER — TELEPHONE (OUTPATIENT)
Dept: NEPHROLOGY | Facility: CLINIC | Age: 45
End: 2020-11-30

## 2020-12-03 ENCOUNTER — HOSPITAL ENCOUNTER (OUTPATIENT)
Dept: RADIOLOGY | Facility: HOSPITAL | Age: 45
Discharge: HOME | End: 2020-12-03
Payer: COMMERCIAL

## 2020-12-03 ENCOUNTER — OFFICE VISIT (OUTPATIENT)
Dept: NEPHROLOGY | Facility: CLINIC | Age: 45
End: 2020-12-03
Payer: COMMERCIAL

## 2020-12-03 ENCOUNTER — TELEMEDICINE (OUTPATIENT)
Dept: FAMILY MEDICINE CLINIC | Facility: CLINIC | Age: 45
End: 2020-12-03
Payer: COMMERCIAL

## 2020-12-03 VITALS
TEMPERATURE: 97.9 F | HEIGHT: 67 IN | SYSTOLIC BLOOD PRESSURE: 128 MMHG | RESPIRATION RATE: 16 BRPM | WEIGHT: 168 LBS | HEART RATE: 80 BPM | DIASTOLIC BLOOD PRESSURE: 80 MMHG | BODY MASS INDEX: 26.37 KG/M2

## 2020-12-03 VITALS
DIASTOLIC BLOOD PRESSURE: 82 MMHG | HEIGHT: 67 IN | TEMPERATURE: 98.9 F | SYSTOLIC BLOOD PRESSURE: 130 MMHG | WEIGHT: 168 LBS | BODY MASS INDEX: 26.37 KG/M2

## 2020-12-03 DIAGNOSIS — R92.8 ABNORMAL MAMMOGRAM: Primary | ICD-10-CM

## 2020-12-03 DIAGNOSIS — I10 HYPERTENSION, UNSPECIFIED TYPE: ICD-10-CM

## 2020-12-03 DIAGNOSIS — N20.0 NEPHROLITHIASIS: ICD-10-CM

## 2020-12-03 DIAGNOSIS — Q61.3 POLYCYSTIC KIDNEY DISEASE: ICD-10-CM

## 2020-12-03 DIAGNOSIS — R92.8 ABNORMAL MAMMOGRAM: ICD-10-CM

## 2020-12-03 DIAGNOSIS — Q61.2 ADPKD (AUTOSOMAL DOMINANT POLYCYSTIC KIDNEY DISEASE): Primary | ICD-10-CM

## 2020-12-03 DIAGNOSIS — B34.9 VIRAL INFECTION, UNSPECIFIED: Primary | ICD-10-CM

## 2020-12-03 PROCEDURE — 99214 OFFICE O/P EST MOD 30 MIN: CPT | Performed by: INTERNAL MEDICINE

## 2020-12-03 PROCEDURE — 3079F DIAST BP 80-89 MM HG: CPT | Performed by: FAMILY MEDICINE

## 2020-12-03 PROCEDURE — 99213 OFFICE O/P EST LOW 20 MIN: CPT | Performed by: FAMILY MEDICINE

## 2020-12-03 PROCEDURE — 3075F SYST BP GE 130 - 139MM HG: CPT | Performed by: FAMILY MEDICINE

## 2020-12-03 PROCEDURE — 3725F SCREEN DEPRESSION PERFORMED: CPT | Performed by: FAMILY MEDICINE

## 2020-12-03 PROCEDURE — U0003 INFECTIOUS AGENT DETECTION BY NUCLEIC ACID (DNA OR RNA); SEVERE ACUTE RESPIRATORY SYNDROME CORONAVIRUS 2 (SARS-COV-2) (CORONAVIRUS DISEASE [COVID-19]), AMPLIFIED PROBE TECHNIQUE, MAKING USE OF HIGH THROUGHPUT TECHNOLOGIES AS DESCRIBED BY CMS-2020-01-R: HCPCS | Performed by: FAMILY MEDICINE

## 2020-12-03 PROCEDURE — 76642 ULTRASOUND BREAST LIMITED: CPT

## 2020-12-06 LAB — SARS-COV-2 RNA SPEC QL NAA+PROBE: DETECTED

## 2020-12-15 ENCOUNTER — TELEMEDICINE (OUTPATIENT)
Dept: FAMILY MEDICINE CLINIC | Facility: CLINIC | Age: 45
End: 2020-12-15
Payer: COMMERCIAL

## 2020-12-15 VITALS — WEIGHT: 168 LBS | HEIGHT: 67 IN | BODY MASS INDEX: 26.37 KG/M2

## 2020-12-15 DIAGNOSIS — U07.1 COVID-19 VIRUS INFECTION: Primary | ICD-10-CM

## 2020-12-15 PROCEDURE — 3008F BODY MASS INDEX DOCD: CPT | Performed by: FAMILY MEDICINE

## 2020-12-15 PROCEDURE — 1036F TOBACCO NON-USER: CPT | Performed by: FAMILY MEDICINE

## 2020-12-15 PROCEDURE — 99213 OFFICE O/P EST LOW 20 MIN: CPT | Performed by: FAMILY MEDICINE

## 2020-12-15 RX ORDER — ALBUTEROL SULFATE 90 UG/1
2 AEROSOL, METERED RESPIRATORY (INHALATION) 3 TIMES DAILY
Qty: 1 INHALER | Refills: 0 | Status: SHIPPED | OUTPATIENT
Start: 2020-12-15 | End: 2021-11-02 | Stop reason: ALTCHOICE

## 2021-02-10 ENCOUNTER — TELEPHONE (OUTPATIENT)
Dept: FAMILY MEDICINE CLINIC | Facility: CLINIC | Age: 46
End: 2021-02-10

## 2021-02-10 NOTE — TELEPHONE ENCOUNTER
Patient called in, she had to drive her mother to get tested for COVID, her mother ended up coming back positive    she had her mask on and windows down    patient had covid on 12/3/20    her question is would she need to quarantine? I spoke w/ clinical they advised that she should have natural immunity to the virus for up to 90 days and that she should monitor symptoms and call back if she develops symptoms

## 2021-03-31 DIAGNOSIS — E55.9 VITAMIN D DEFICIENCY: ICD-10-CM

## 2021-03-31 NOTE — TELEPHONE ENCOUNTER
Left a detailed message for patient to schedule an office visit she is due in April     [facility] requested a refill of Vitamin D

## 2021-06-04 ENCOUNTER — TELEPHONE (OUTPATIENT)
Dept: FAMILY MEDICINE CLINIC | Facility: CLINIC | Age: 46
End: 2021-06-04

## 2021-06-04 ENCOUNTER — TELEPHONE (OUTPATIENT)
Dept: OTHER | Facility: OTHER | Age: 46
End: 2021-06-04

## 2021-06-04 DIAGNOSIS — I10 ESSENTIAL HYPERTENSION: ICD-10-CM

## 2021-06-04 RX ORDER — OLMESARTAN MEDOXOMIL 5 MG/1
10 TABLET ORAL EVERY MORNING
Qty: 60 TABLET | Refills: 0 | Status: SHIPPED | OUTPATIENT
Start: 2021-06-04 | End: 2021-07-13

## 2021-06-04 RX ORDER — OLMESARTAN MEDOXOMIL 5 MG/1
10 TABLET ORAL EVERY MORNING
Qty: 180 TABLET | Refills: 3 | Status: CANCELLED | OUTPATIENT
Start: 2021-06-04

## 2021-06-04 NOTE — TELEPHONE ENCOUNTER
Patient called for a refill of Olmesartan  Patient was advised to contact Nephrology as they are theones who have prescribed it  Patient declined to schedule a follow up with Dr Sharan Treadwell as she stated she only see's him once a year

## 2021-06-04 NOTE — TELEPHONE ENCOUNTER
Pt called stating that she called the office today and her Benicar was supposed to be refilled but no one ever got back to her  She is leaving for vacation on Monday morning and needs more Benicar before she leaves  Contacted on call provider to check with him to see if it is ok that I send rx in  On call provider allowed me to send in a 30 day rx for this medication  Contacted pt to make her aware of same and she was appreciative

## 2021-06-07 NOTE — TELEPHONE ENCOUNTER
Patient's pharmacy requested a refill of medication  Patient is due for a follow up visit   Needs to schedule

## 2021-08-30 ENCOUNTER — TELEPHONE (OUTPATIENT)
Dept: FAMILY MEDICINE CLINIC | Facility: CLINIC | Age: 46
End: 2021-08-30

## 2021-08-30 ENCOUNTER — TELEPHONE (OUTPATIENT)
Dept: UROLOGY | Facility: MEDICAL CENTER | Age: 46
End: 2021-08-30

## 2021-08-30 DIAGNOSIS — I10 ESSENTIAL HYPERTENSION: ICD-10-CM

## 2021-08-30 RX ORDER — OLMESARTAN MEDOXOMIL 5 MG/1
10 TABLET ORAL DAILY
Qty: 180 TABLET | Refills: 1 | Status: SHIPPED | OUTPATIENT
Start: 2021-08-30 | End: 2021-09-24 | Stop reason: SDUPTHER

## 2021-08-30 NOTE — TELEPHONE ENCOUNTER
Pt called and set up her preventative visit but will not have enough blood pressure mediatication until 11/2    It's her benicar

## 2021-08-30 NOTE — TELEPHONE ENCOUNTER
Patient advised  She was asking since she's supposed to get a mammo every 6 months due to a cyst she has, is there something else that could be done that's less painful  She states her breasts are sore and when they do the test it's so painful for her  I told her I would send you a message

## 2021-08-30 NOTE — TELEPHONE ENCOUNTER
Please Triage - [address] Patient-     What is the reason for the patients appointment? Patient called stating she was referred by her Nephrologist Dr Martín Collins  Patient has polycystic Kidney disease  Patient has a large cyst on right kidney  Patient to follow up with Urology       Imaging/Lab Results:ct scan in epic       Do we accept the patient's insurance or is the patient Self-Pay? Provider & Plan: [facility]   Member ID#: Has the patient had any previous urologist(s)?no        Have patient records been requested?in epic        Has the patient had any outside testing done?       Does the patient have a personal history of cancer?no       Patient can be reached at :[phone] (m)

## 2021-09-21 ENCOUNTER — OFFICE VISIT (OUTPATIENT)
Dept: UROLOGY | Facility: AMBULATORY SURGERY CENTER | Age: 46
End: 2021-09-21
Payer: COMMERCIAL

## 2021-09-21 VITALS
SYSTOLIC BLOOD PRESSURE: 130 MMHG | BODY MASS INDEX: 26.37 KG/M2 | HEART RATE: 80 BPM | HEIGHT: 67 IN | DIASTOLIC BLOOD PRESSURE: 70 MMHG | WEIGHT: 168 LBS

## 2021-09-21 DIAGNOSIS — N28.1 RENAL CYST: Primary | ICD-10-CM

## 2021-09-21 DIAGNOSIS — Q61.3 POLYCYSTIC KIDNEY DISEASE: ICD-10-CM

## 2021-09-21 DIAGNOSIS — N20.0 NEPHROLITHIASIS: ICD-10-CM

## 2021-09-21 PROCEDURE — 3008F BODY MASS INDEX DOCD: CPT | Performed by: NURSE PRACTITIONER

## 2021-09-21 PROCEDURE — 1036F TOBACCO NON-USER: CPT | Performed by: NURSE PRACTITIONER

## 2021-09-21 PROCEDURE — 99204 OFFICE O/P NEW MOD 45 MIN: CPT | Performed by: NURSE PRACTITIONER

## 2021-09-21 NOTE — PROGRESS NOTES
9/21/2021    [facility]  1975  [id number]        Assessment  -Renal cyst  -Polycystic kidney   -History of nephrolithiasis     Discussion/Plan  Trena Dai is a 51 y o  female who presents in consultation today for evaluation of renal cyst    1  Renal cyst-   We reviewed the results of her recent imaging from November 2020 which identified exophytic right lower pole lesion and multiple renal cysts  Last creatinine was 0 83  Based on findings, would recommend proceeding with MRI renal protocol to further evaluate cysts and lesion  She is otherwise asymptomatic  Follow-up once MRI complete to review results  She was instructed to call sooner with any issues     -All questions answered, patients agree with plan     History of Present Illness  51 y o  female who presents in consultation today for evaluation of renal cyst   Patient referred by nephrology  She has history of polycystic kidney and renal cysts  Last imaging from November 2020, which was CT stone study, identified multiple right renal cysts and exophytic lesion in right lower pole, increase in size compared to prior imaging  Patient denies any flank pain, lower urinary tract symptoms, gross hematuria, or dysuria  She reports occasional episodes of vaginal dryness and itching  She denies any prior urologic history, surgical intervention, or instrumentation  Patient denies any strong family history of bladder or kidney malignancy  Review of Systems  Review of Systems   Constitutional: Negative  HENT: Negative  Respiratory: Negative  Cardiovascular: Negative  Gastrointestinal: Negative  Genitourinary: Negative for decreased urine volume, difficulty urinating, dysuria, flank pain, frequency, hematuria and urgency  Musculoskeletal: Negative  Skin: Negative  Neurological: Negative  Psychiatric/Behavioral: Negative          Past Medical History  Past Medical History:   Diagnosis Date    Polycystic kidney disease        Past Social History  Past Surgical History:   Procedure Laterality Date     SECTION      MAMMO (HISTORICAL)  2017       Past Family History  Family History   Problem Relation Age of Onset    Hypertension Mother     Other Mother         kidney stone    Polycystic kidney disease Mother     Hypertension Father     Kidney disease Maternal Grandmother     Other Maternal Grandmother         Kidney stone    Brain cancer Paternal Grandfather         unknown age   Vena Mati No Known Problems Sister     No Known Problems Daughter     No Known Problems Son     No Known Problems Brother     No Known Problems Brother        Past Social history  Social History     Socioeconomic History    Marital status: /Civil Union     Spouse name: Not on file    Number of children: Not on file    Years of education: Not on file    Highest education level: 12th grade   Occupational History    Occupation: Retail    Tobacco Use    Smoking status: Never Smoker    Smokeless tobacco: Never Used   Vaping Use    Vaping Use: Never used   Substance and Sexual Activity    Alcohol use: No     Comment: Hx; Mod    Drug use: No    Sexual activity: Not on file   Other Topics Concern    Not on file   Social History Narrative    Do you currently or have you served in the [employer]: No    Were you activated, into active duty, as a member of the [employer] or as a Reservist: No    Occupation:  retail store    Education: 15    Marital status:     Sexual orientation: Heterosexual    Exercise level: Moderate    Diet: Regular    General stress level: Medium    Alcohol intake: Moderate    Caffeine intake:  Moderate    Chewing tobacco: none    Illicit drugs: none    Seat belts used routinely: Yes    Sunscreen used routinely: Yes    Smoke alarm in home: Yes    Advance directive: No    Salt Intake: minimal    Has the Patient had a mammogram to screen for breast cancer within 24 months: No Social Determinants of Health     Financial Resource Strain:     Difficulty of Paying Living Expenses:    Food Insecurity:     Worried About Running Out of Food in the Last Year:     [address] in the Last Year:    Transportation Needs:     Lack of Transportation (Medical):  Lack of Transportation (Non-Medical):    Physical Activity:     Days of Exercise per Week:     Minutes of Exercise per Session:    Stress:     Feeling of Stress :    Social Connections:     Frequency of Communication with Friends and Family:     Frequency of Social Gatherings with Friends and Family:     Attends Yarsanism Services:     Active Member of Clubs or Organizations:     Attends Club or Organization Meetings:     Marital Status:    Intimate Partner Violence:     Fear of Current or Ex-Partner:     Emotionally Abused:     Physically Abused:     Sexually Abused:        Current Medications  Current Outpatient Medications   Medication Sig Dispense Refill    albuterol (PROVENTIL HFA,VENTOLIN HFA) 90 mcg/act inhaler Inhale 2 puffs 3 (three) times a day 1 Inhaler 0    ergocalciferol (VITAMIN D2) 50,000 units Take 1 capsule (50,000 Units total) by mouth once a week 12 capsule 1    olmesartan (BENICAR) 5 mg tablet Take 2 tablets (10 mg total) by mouth daily 180 tablet 1     No current facility-administered medications for this visit  Allergies  Allergies   Allergen Reactions    Prednisone Hypertension       Past medical history, social history, family history, medications and allergies were reviewed  Vitals  There were no vitals filed for this visit  Physical Exam  Physical Exam  Constitutional:       Appearance: Normal appearance  She is well-developed  HENT:      Head: Normocephalic  Eyes:      Pupils: Pupils are equal, round, and reactive to light  Pulmonary:      Effort: Pulmonary effort is normal    Abdominal:      Palpations: Abdomen is soft  Tenderness:  There is no right CVA tenderness or left CVA tenderness  Musculoskeletal:         General: Normal range of motion  Cervical back: Normal range of motion  Skin:     General: Skin is warm and dry  Neurological:      General: No focal deficit present  Mental Status: She is alert and oriented to person, place, and time  Psychiatric:         Mood and Affect: Mood normal          Behavior: Behavior normal          Thought Content: Thought content normal          Judgment: Judgment normal          Results    I have personally reviewed all pertinent lab results and reviewed with patient  Lab Results   Component Value Date    GLUCOSE 82 01/08/2016    CALCIUM 8 6 10/19/2020     01/08/2016    K 4 4 10/19/2020    CO2 25 10/19/2020     10/19/2020    BUN 18 10/19/2020    CREATININE 0 83 10/19/2020     Lab Results   Component Value Date    WBC 5 65 06/28/2019    HGB 12 4 06/28/2019    HCT 38 9 06/28/2019    MCV 95 06/28/2019     06/28/2019     No results found for this or any previous visit (from the past 1 hour(s))

## 2021-09-24 DIAGNOSIS — I10 ESSENTIAL HYPERTENSION: ICD-10-CM

## 2021-09-24 NOTE — TELEPHONE ENCOUNTER
----- Message from Ele Nguyen sent at 9/24/2021  3:59 PM EDT -----  Pt requesting refill of olmesartan  Everything is correct but the pharmacy  Pt wants it sent to [address], [id number]  Thank you!

## 2021-09-26 RX ORDER — OLMESARTAN MEDOXOMIL 5 MG/1
10 TABLET ORAL DAILY
Qty: 180 TABLET | Refills: 1 | Status: SHIPPED | OUTPATIENT
Start: 2021-09-26 | End: 2022-03-27

## 2021-10-04 ENCOUNTER — TELEPHONE (OUTPATIENT)
Dept: RADIOLOGY | Facility: HOSPITAL | Age: 46
End: 2021-10-04

## 2021-10-06 ENCOUNTER — HOSPITAL ENCOUNTER (OUTPATIENT)
Dept: RADIOLOGY | Facility: HOSPITAL | Age: 46
Discharge: HOME | End: 2021-10-06
Payer: COMMERCIAL

## 2021-10-06 VITALS — HEIGHT: 67 IN | BODY MASS INDEX: 26.21 KG/M2 | WEIGHT: 167 LBS

## 2021-10-06 DIAGNOSIS — R92.8 ABNORMAL FINDINGS ON DIAGNOSTIC IMAGING OF BREAST: ICD-10-CM

## 2021-10-06 DIAGNOSIS — R92.8 ABNORMAL MAMMOGRAM: ICD-10-CM

## 2021-10-06 PROCEDURE — 76642 ULTRASOUND BREAST LIMITED: CPT

## 2021-10-06 PROCEDURE — G0279 TOMOSYNTHESIS, MAMMO: HCPCS

## 2021-10-06 PROCEDURE — 77066 DX MAMMO INCL CAD BI: CPT

## 2021-10-07 ENCOUNTER — TELEPHONE (OUTPATIENT)
Dept: FAMILY MEDICINE CLINIC | Facility: CLINIC | Age: 46
End: 2021-10-07

## 2021-10-20 ENCOUNTER — HOSPITAL ENCOUNTER (OUTPATIENT)
Dept: MRI IMAGING | Facility: HOSPITAL | Age: 46
Discharge: HOME | End: 2021-10-20
Payer: COMMERCIAL

## 2021-10-20 DIAGNOSIS — N28.1 RENAL CYST: ICD-10-CM

## 2021-10-20 PROCEDURE — 74183 MRI ABD W/O CNTR FLWD CNTR: CPT

## 2021-10-20 PROCEDURE — G1004 CDSM NDSC: HCPCS

## 2021-10-20 PROCEDURE — A9585 GADOBUTROL INJECTION: HCPCS | Performed by: NURSE PRACTITIONER

## 2021-10-20 RX ADMIN — GADOBUTROL 7 ML: 604.72 INJECTION INTRAVENOUS at 23:25

## 2021-10-25 ENCOUNTER — OFFICE VISIT (OUTPATIENT)
Dept: UROLOGY | Facility: AMBULATORY SURGERY CENTER | Age: 46
End: 2021-10-25
Payer: COMMERCIAL

## 2021-10-25 VITALS
DIASTOLIC BLOOD PRESSURE: 72 MMHG | SYSTOLIC BLOOD PRESSURE: 130 MMHG | HEIGHT: 67 IN | WEIGHT: 168 LBS | HEART RATE: 80 BPM | BODY MASS INDEX: 26.37 KG/M2

## 2021-10-25 DIAGNOSIS — N20.0 NEPHROLITHIASIS: Primary | ICD-10-CM

## 2021-10-25 DIAGNOSIS — N28.1 RENAL CYST: ICD-10-CM

## 2021-10-25 DIAGNOSIS — Q61.3 POLYCYSTIC KIDNEY DISEASE: ICD-10-CM

## 2021-10-25 LAB
BACTERIA UR QL AUTO: ABNORMAL /HPF
BILIRUB UR QL STRIP: NEGATIVE
CLARITY UR: ABNORMAL
COLOR UR: ABNORMAL
GLUCOSE UR STRIP-MCNC: NEGATIVE MG/DL
HGB UR QL STRIP.AUTO: ABNORMAL
HYALINE CASTS #/AREA URNS LPF: ABNORMAL /LPF
KETONES UR STRIP-MCNC: NEGATIVE MG/DL
LEUKOCYTE ESTERASE UR QL STRIP: ABNORMAL
NITRITE UR QL STRIP: POSITIVE
NON-SQ EPI CELLS URNS QL MICRO: ABNORMAL /HPF
PH UR STRIP.AUTO: 6 [PH]
PROT UR STRIP-MCNC: ABNORMAL MG/DL
RBC #/AREA URNS AUTO: ABNORMAL /HPF
SL AMB  POCT GLUCOSE, UA: NORMAL
SL AMB LEUKOCYTE ESTERASE,UA: NORMAL
SL AMB POCT BILIRUBIN,UA: NORMAL
SL AMB POCT BLOOD,UA: NORMAL
SL AMB POCT CLARITY,UA: CLEAR
SL AMB POCT COLOR,UA: YELLOW
SL AMB POCT KETONES,UA: NORMAL
SL AMB POCT NITRITE,UA: NORMAL
SL AMB POCT PH,UA: 5
SL AMB POCT SPECIFIC GRAVITY,UA: 1.01
SL AMB POCT URINE PROTEIN: NORMAL
SL AMB POCT UROBILINOGEN: 0.2
SP GR UR STRIP.AUTO: 1.02 (ref 1–1.03)
UROBILINOGEN UR QL STRIP.AUTO: 0.2 E.U./DL
WBC #/AREA URNS AUTO: ABNORMAL /HPF

## 2021-10-25 PROCEDURE — 99213 OFFICE O/P EST LOW 20 MIN: CPT | Performed by: NURSE PRACTITIONER

## 2021-10-25 PROCEDURE — 81001 URINALYSIS AUTO W/SCOPE: CPT | Performed by: NURSE PRACTITIONER

## 2021-10-25 PROCEDURE — 3008F BODY MASS INDEX DOCD: CPT | Performed by: NURSE PRACTITIONER

## 2021-10-25 PROCEDURE — 81002 URINALYSIS NONAUTO W/O SCOPE: CPT | Performed by: NURSE PRACTITIONER

## 2021-11-02 ENCOUNTER — TELEPHONE (OUTPATIENT)
Dept: NEPHROLOGY | Facility: CLINIC | Age: 46
End: 2021-11-02

## 2021-11-02 ENCOUNTER — OFFICE VISIT (OUTPATIENT)
Dept: FAMILY MEDICINE CLINIC | Facility: CLINIC | Age: 46
End: 2021-11-02
Payer: COMMERCIAL

## 2021-11-02 VITALS
HEART RATE: 72 BPM | OXYGEN SATURATION: 97 % | SYSTOLIC BLOOD PRESSURE: 136 MMHG | DIASTOLIC BLOOD PRESSURE: 84 MMHG | RESPIRATION RATE: 16 BRPM | WEIGHT: 171.4 LBS | HEIGHT: 67 IN | BODY MASS INDEX: 26.9 KG/M2

## 2021-11-02 DIAGNOSIS — E55.9 VITAMIN D DEFICIENCY: ICD-10-CM

## 2021-11-02 DIAGNOSIS — Z11.59 NEED FOR HEPATITIS C SCREENING TEST: ICD-10-CM

## 2021-11-02 DIAGNOSIS — Z00.00 WELL ADULT EXAM: Primary | ICD-10-CM

## 2021-11-02 DIAGNOSIS — N89.8 VAGINAL IRRITATION: ICD-10-CM

## 2021-11-02 DIAGNOSIS — I10 ESSENTIAL HYPERTENSION: ICD-10-CM

## 2021-11-02 PROCEDURE — 99396 PREV VISIT EST AGE 40-64: CPT | Performed by: FAMILY MEDICINE

## 2021-11-02 PROCEDURE — 3725F SCREEN DEPRESSION PERFORMED: CPT | Performed by: FAMILY MEDICINE

## 2021-11-04 ENCOUNTER — OFFICE VISIT (OUTPATIENT)
Dept: NEPHROLOGY | Facility: CLINIC | Age: 46
End: 2021-11-04
Payer: COMMERCIAL

## 2021-11-04 VITALS
HEIGHT: 67 IN | BODY MASS INDEX: 26.84 KG/M2 | DIASTOLIC BLOOD PRESSURE: 70 MMHG | SYSTOLIC BLOOD PRESSURE: 112 MMHG | HEART RATE: 70 BPM | WEIGHT: 171 LBS

## 2021-11-04 DIAGNOSIS — Q61.2 ADPKD (AUTOSOMAL DOMINANT POLYCYSTIC KIDNEY DISEASE): Primary | ICD-10-CM

## 2021-11-04 DIAGNOSIS — I10 HYPERTENSION, UNSPECIFIED TYPE: ICD-10-CM

## 2021-11-04 DIAGNOSIS — Q61.3 POLYCYSTIC KIDNEY DISEASE: ICD-10-CM

## 2021-11-04 PROCEDURE — 1036F TOBACCO NON-USER: CPT | Performed by: INTERNAL MEDICINE

## 2021-11-04 PROCEDURE — 3008F BODY MASS INDEX DOCD: CPT | Performed by: INTERNAL MEDICINE

## 2021-11-04 PROCEDURE — 99214 OFFICE O/P EST MOD 30 MIN: CPT | Performed by: INTERNAL MEDICINE

## 2021-11-04 RX ORDER — DIPHENOXYLATE HYDROCHLORIDE AND ATROPINE SULFATE 2.5; .025 MG/1; MG/1
1 TABLET ORAL AS NEEDED
COMMUNITY

## 2021-11-08 LAB
25(OH)D3 SERPL-MCNC: 25 NG/ML (ref 30–100)
ALBUMIN SERPL-MCNC: 3.9 G/DL (ref 3.6–5.1)
ALBUMIN/GLOB SERPL: 1.3 (CALC) (ref 1–2.5)
ALP SERPL-CCNC: 44 U/L (ref 31–125)
ALT SERPL-CCNC: 10 U/L (ref 6–29)
AST SERPL-CCNC: 11 U/L (ref 10–35)
BASOPHILS # BLD AUTO: 28 CELLS/UL (ref 0–200)
BASOPHILS NFR BLD AUTO: 0.6 %
BILIRUB DIRECT SERPL-MCNC: 0.1 MG/DL
BILIRUB INDIRECT SERPL-MCNC: 0.4 MG/DL (CALC) (ref 0.2–1.2)
BILIRUB SERPL-MCNC: 0.5 MG/DL (ref 0.2–1.2)
BUN SERPL-MCNC: 13 MG/DL (ref 7–25)
BUN/CREAT SERPL: NORMAL (CALC) (ref 6–22)
CALCIUM SERPL-MCNC: 8.9 MG/DL (ref 8.6–10.2)
CHLORIDE SERPL-SCNC: 107 MMOL/L (ref 98–110)
CHOLEST SERPL-MCNC: 194 MG/DL
CHOLEST/HDLC SERPL: 2.1 (CALC)
CO2 SERPL-SCNC: 27 MMOL/L (ref 20–32)
CREAT SERPL-MCNC: 0.65 MG/DL (ref 0.5–1.1)
EOSINOPHIL # BLD AUTO: 108 CELLS/UL (ref 15–500)
EOSINOPHIL NFR BLD AUTO: 2.3 %
ERYTHROCYTE [DISTWIDTH] IN BLOOD BY AUTOMATED COUNT: 12.9 % (ref 11–15)
FSH SERPL-ACNC: 8.4 MIU/ML
GLOBULIN SER CALC-MCNC: 2.9 G/DL (CALC) (ref 1.9–3.7)
GLUCOSE SERPL-MCNC: 93 MG/DL (ref 65–99)
HCT VFR BLD AUTO: 37.7 % (ref 35–45)
HCV AB S/CO SERPL IA: <0.02
HCV AB SERPL QL IA: NORMAL
HDLC SERPL-MCNC: 94 MG/DL
HGB BLD-MCNC: 12.1 G/DL (ref 11.7–15.5)
LDLC SERPL CALC-MCNC: 85 MG/DL (CALC)
LH SERPL-ACNC: 3.5 MIU/ML
LYMPHOCYTES # BLD AUTO: 1429 CELLS/UL (ref 850–3900)
LYMPHOCYTES NFR BLD AUTO: 30.4 %
MCH RBC QN AUTO: 29.4 PG (ref 27–33)
MCHC RBC AUTO-ENTMCNC: 32.1 G/DL (ref 32–36)
MCV RBC AUTO: 91.5 FL (ref 80–100)
MONOCYTES # BLD AUTO: 428 CELLS/UL (ref 200–950)
MONOCYTES NFR BLD AUTO: 9.1 %
NEUTROPHILS # BLD AUTO: 2707 CELLS/UL (ref 1500–7800)
NEUTROPHILS NFR BLD AUTO: 57.6 %
NONHDLC SERPL-MCNC: 100 MG/DL (CALC)
PLATELET # BLD AUTO: 296 THOUSAND/UL (ref 140–400)
PMV BLD REES-ECKER: 9.3 FL (ref 7.5–12.5)
POTASSIUM SERPL-SCNC: 4.8 MMOL/L (ref 3.5–5.3)
PROT SERPL-MCNC: 6.8 G/DL (ref 6.1–8.1)
RBC # BLD AUTO: 4.12 MILLION/UL (ref 3.8–5.1)
SL AMB EGFR AFRICAN AMERICAN: 123 ML/MIN/1.73M2
SL AMB EGFR NON AFRICAN AMERICAN: 106 ML/MIN/1.73M2
SODIUM SERPL-SCNC: 139 MMOL/L (ref 135–146)
TRIGL SERPL-MCNC: 65 MG/DL
WBC # BLD AUTO: 4.7 THOUSAND/UL (ref 3.8–10.8)

## 2021-11-19 ENCOUNTER — TELEPHONE (OUTPATIENT)
Dept: NEPHROLOGY | Facility: CLINIC | Age: 46
End: 2021-11-19

## 2021-11-23 LAB
CREAT UR-MCNC: 19 MG/DL (ref 20–275)
PROT UR-MCNC: <4 MG/DL (ref 5–24)
PROT/CREAT UR: ABNORMAL MG/G CREAT (ref 21–161)
PROT/CREAT UR: ABNORMAL MG/MG CREAT (ref 0.02–0.16)

## 2022-03-27 DIAGNOSIS — I10 ESSENTIAL HYPERTENSION: ICD-10-CM

## 2022-03-27 RX ORDER — OLMESARTAN MEDOXOMIL 5 MG/1
TABLET ORAL
Qty: 180 TABLET | Refills: 1 | Status: SHIPPED | OUTPATIENT
Start: 2022-03-27

## 2022-04-21 ENCOUNTER — TELEPHONE (OUTPATIENT)
Dept: FAMILY MEDICINE CLINIC | Facility: CLINIC | Age: 47
End: 2022-04-21

## 2022-04-21 NOTE — TELEPHONE ENCOUNTER
Patient called and advised she did not feel well when she woke up  Went to work and ended up vomiting 3 times  Was sent home from work  These are the only symptoms the patient had  Patient was advised it is more than likely a viral gastrointestinal virus  Rest today and introduce clear liquids slowly then add dry crackers toast and rice ect  If not better tomorrow and needs a note for work she can be seen for a virtual appointment

## 2022-06-17 ENCOUNTER — TELEPHONE (OUTPATIENT)
Dept: NEPHROLOGY | Facility: CLINIC | Age: 47
End: 2022-06-17

## 2022-06-29 NOTE — TELEPHONE ENCOUNTER
Left message to schedule November follow up [facility]  This is the second attempt

## 2022-08-09 ENCOUNTER — TELEPHONE (OUTPATIENT)
Dept: NEPHROLOGY | Facility: CLINIC | Age: 47
End: 2022-08-09

## 2022-08-09 DIAGNOSIS — Q61.3 POLYCYSTIC KIDNEY DISEASE: Primary | ICD-10-CM

## 2022-08-09 DIAGNOSIS — N20.0 NEPHROLITHIASIS: ICD-10-CM

## 2022-08-09 NOTE — TELEPHONE ENCOUNTER
Patient called to schedule follow up appointment  She said she doesn't have any lab orders and was wondering if she needed any before that appointment  I told her I would reach out to Dr Arturo Kent and let her know what he says

## 2022-09-21 ENCOUNTER — APPOINTMENT (OUTPATIENT)
Dept: LAB | Facility: CLINIC | Age: 47
End: 2022-09-21
Payer: COMMERCIAL

## 2022-09-21 DIAGNOSIS — Q61.3 POLYCYSTIC KIDNEY DISEASE: ICD-10-CM

## 2022-09-21 DIAGNOSIS — N20.0 NEPHROLITHIASIS: ICD-10-CM

## 2022-09-21 LAB
ANION GAP SERPL CALCULATED.3IONS-SCNC: 2 MMOL/L (ref 4–13)
BUN SERPL-MCNC: 14 MG/DL (ref 5–25)
CALCIUM SERPL-MCNC: 9.1 MG/DL (ref 8.3–10.1)
CHLORIDE SERPL-SCNC: 110 MMOL/L (ref 96–108)
CO2 SERPL-SCNC: 26 MMOL/L (ref 21–32)
CREAT SERPL-MCNC: 0.79 MG/DL (ref 0.6–1.3)
CREAT UR-MCNC: 123 MG/DL
GFR SERPL CREATININE-BSD FRML MDRD: 88 ML/MIN/1.73SQ M
GLUCOSE P FAST SERPL-MCNC: 93 MG/DL (ref 65–99)
POTASSIUM SERPL-SCNC: 4.9 MMOL/L (ref 3.5–5.3)
PROT UR-MCNC: 13 MG/DL
PROT/CREAT UR: 0.11 MG/G{CREAT} (ref 0–0.1)
SODIUM SERPL-SCNC: 138 MMOL/L (ref 135–147)

## 2022-09-21 PROCEDURE — 36415 COLL VENOUS BLD VENIPUNCTURE: CPT

## 2022-09-21 PROCEDURE — 80048 BASIC METABOLIC PNL TOTAL CA: CPT

## 2022-09-23 DIAGNOSIS — I10 ESSENTIAL HYPERTENSION: ICD-10-CM

## 2022-09-24 RX ORDER — OLMESARTAN MEDOXOMIL 5 MG/1
TABLET ORAL
Qty: 180 TABLET | Refills: 1 | Status: SHIPPED | OUTPATIENT
Start: 2022-09-24

## 2022-09-30 ENCOUNTER — OFFICE VISIT (OUTPATIENT)
Dept: NEPHROLOGY | Facility: CLINIC | Age: 47
End: 2022-09-30
Payer: COMMERCIAL

## 2022-09-30 VITALS
BODY MASS INDEX: 26.68 KG/M2 | HEIGHT: 67 IN | SYSTOLIC BLOOD PRESSURE: 138 MMHG | HEART RATE: 80 BPM | DIASTOLIC BLOOD PRESSURE: 82 MMHG | WEIGHT: 170 LBS

## 2022-09-30 DIAGNOSIS — Q61.3 POLYCYSTIC KIDNEY DISEASE: Primary | ICD-10-CM

## 2022-09-30 DIAGNOSIS — I10 HYPERTENSION, UNSPECIFIED TYPE: ICD-10-CM

## 2022-09-30 PROCEDURE — 99214 OFFICE O/P EST MOD 30 MIN: CPT | Performed by: INTERNAL MEDICINE

## 2022-09-30 NOTE — PROGRESS NOTES
NEPHROLOGY PROGRESS NOTE    Mahnaz Workman Francenia Gaucher 49 y o  female MRN: [id number]  Unit/Bed#:  Encounter: [id number]  Reason for Consult:  Polycystic kidney disease and hypertension    Patient is here for routine follow-up she has been busy working but she has been doing well with her health with no reported issues she has had no kidney stone event no hospitalizations  No change to medications  She is staying busy with work in her family  ASSESSMENT/PLAN:  1  Renal    Patient has polycystic kidney disease and at this point creatinine remains normal at 0 7 and there was no significant proteinuria as a ratio estimates about 100 mg  Her blood pressure is under good control on monotherapy with olmesartan  At this point the patient feels well she has had no progression of her renal dysfunction creatinine remains normal blood pressure is under good control  She also has a lipid profile that is excellent with an HDL that is greater than her LDL  She discussed dietary issues and I told her just to eat a normal healthy diet low-sodium to help prevent worsening blood pressure  I told her she does not need to restrict protein kidney to normal diet  Labs and follow-up as scheduled  She was told to call if there is any problems or concerns before next visit  SUBJECTIVE:  Review of Systems   Constitutional: Negative for chills, fever, malaise/fatigue and night sweats  HENT: Negative  Eyes: Negative  Cardiovascular: Negative for chest pain, dyspnea on exertion, leg swelling and orthopnea  Respiratory: Negative  Negative for cough, shortness of breath, sputum production and wheezing  Gastrointestinal: Negative for abdominal pain, diarrhea, nausea and vomiting  Genitourinary: Negative for dysuria, flank pain, hematuria and incomplete emptying  Neurological: Negative for dizziness, focal weakness, headaches and weakness     Psychiatric/Behavioral: Negative for altered mental status, depression, hallucinations and hypervigilance  OBJECTIVE:  Current Weight: Weight - Scale: 77 1 kg (170 lb)  [email or web address]     Blood pressure 138/82, pulse 80, height 5' 7" (1 702 m), weight 77 1 kg (170 lb)  , Body mass index is 26 63 kg/m²  [unfilled]    Physical Exam: /82 (BP Location: Left arm, Patient Position: Sitting, Cuff Size: Standard)   Pulse 80   Ht 5' 7" (1 702 m)   Wt 77 1 kg (170 lb)   BMI 26 63 kg/m²   Physical Exam  Constitutional:       General: She is not in acute distress  Appearance: She is not toxic-appearing or diaphoretic  HENT:      Head: Normocephalic and atraumatic  Nose: Nose normal       Mouth/Throat:      Mouth: Mucous membranes are moist    Eyes:      General: No scleral icterus  Extraocular Movements: Extraocular movements intact  Cardiovascular:      Rate and Rhythm: Normal rate and regular rhythm  Heart sounds: No friction rub  No gallop  Comments: No edema  Pulmonary:      Effort: Pulmonary effort is normal  No respiratory distress  Breath sounds: No wheezing, rhonchi or rales  Abdominal:      General: Bowel sounds are normal  There is no distension  Palpations: Abdomen is soft  Tenderness: There is no abdominal tenderness  There is no rebound  Musculoskeletal:      Cervical back: Normal range of motion and neck supple  Neurological:      General: No focal deficit present  Mental Status: She is alert and oriented to person, place, and time  Mental status is at baseline  Psychiatric:         Mood and Affect: Mood normal          Behavior: Behavior normal          Thought Content:  Thought content normal          Judgment: Judgment normal          Medications:    Current Outpatient Medications:     multivitamin (THERAGRAN) TABS, Take 1 tablet by mouth as needed When patient remembers to take them, Disp: , Rfl:     olmesartan (BENICAR) 5 mg tablet, TAKE 2 TABLETS BY MOUTH EVERY DAY, Disp: 180 tablet, Rfl: 1    Laboratory Results:  Lab Results   Component Value Date    WBC 4 7 11/08/2021    HGB 12 1 11/08/2021    HCT 37 7 11/08/2021    MCV 91 5 11/08/2021     11/08/2021     Lab Results   Component Value Date    SODIUM 138 09/21/2022    K 4 9 09/21/2022     (H) 09/21/2022    CO2 26 09/21/2022    BUN 14 09/21/2022    CREATININE 0 79 09/21/2022    GLUC 93 11/08/2021    CALCIUM 9 1 09/21/2022     Lab Results   Component Value Date    CALCIUM 9 1 09/21/2022     No results found for: LABPROT

## 2022-09-30 NOTE — LETTER
September 30, 2022     Rafaela Hobson MD  [address]  [address]  [facility] [address]    Patient: Patricia Tabares   YOB: 1975   Date of Visit: 9/30/2022       Dear Dr Zhao Anne: Thank you for referring Patricia Tabares to me for evaluation  Below are my notes for this consultation  If you have questions, please do not hesitate to call me  I look forward to following your patient along with you  Sincerely,        Eugene Vigil MD        CC: No Recipients  Eugene Vigil MD  9/30/2022  5:27 PM  Sign when Signing Visit  NEPHROLOGY PROGRESS NOTE    Patricia Tabares 49 y o  female MRN: [id number]  Unit/Bed#:  Encounter: [id number]  Reason for Consult:  Polycystic kidney disease and hypertension    Patient is here for routine follow-up she has been busy working but she has been doing well with her health with no reported issues she has had no kidney stone event no hospitalizations  No change to medications  She is staying busy with work in her family  ASSESSMENT/PLAN:  1  Renal    Patient has polycystic kidney disease and at this point creatinine remains normal at 0 7 and there was no significant proteinuria as a ratio estimates about 100 mg  Her blood pressure is under good control on monotherapy with olmesartan  At this point the patient feels well she has had no progression of her renal dysfunction creatinine remains normal blood pressure is under good control  She also has a lipid profile that is excellent with an HDL that is greater than her LDL  She discussed dietary issues and I told her just to eat a normal healthy diet low-sodium to help prevent worsening blood pressure  I told her she does not need to restrict protein kidney to normal diet  Labs and follow-up as scheduled  She was told to call if there is any problems or concerns before next visit  SUBJECTIVE:  Review of Systems   Constitutional: Negative for chills, fever, malaise/fatigue and night sweats  HENT: Negative  Eyes: Negative  Cardiovascular: Negative for chest pain, dyspnea on exertion, leg swelling and orthopnea  Respiratory: Negative  Negative for cough, shortness of breath, sputum production and wheezing  Gastrointestinal: Negative for abdominal pain, diarrhea, nausea and vomiting  Genitourinary: Negative for dysuria, flank pain, hematuria and incomplete emptying  Neurological: Negative for dizziness, focal weakness, headaches and weakness  Psychiatric/Behavioral: Negative for altered mental status, depression, hallucinations and hypervigilance  OBJECTIVE:  Current Weight: Weight - Scale: 77 1 kg (170 lb)  [email or web address]:     Blood pressure 138/82, pulse 80, height 5' 7" (1 702 m), weight 77 1 kg (170 lb)  , Body mass index is 26 63 kg/m²  [unfilled]    Physical Exam: /82 (BP Location: Left arm, Patient Position: Sitting, Cuff Size: Standard)   Pulse 80   Ht 5' 7" (1 702 m)   Wt 77 1 kg (170 lb)   BMI 26 63 kg/m²   Physical Exam  Constitutional:       General: She is not in acute distress  Appearance: She is not toxic-appearing or diaphoretic  HENT:      Head: Normocephalic and atraumatic  Nose: Nose normal       Mouth/Throat:      Mouth: Mucous membranes are moist    Eyes:      General: No scleral icterus  Extraocular Movements: Extraocular movements intact  Cardiovascular:      Rate and Rhythm: Normal rate and regular rhythm  Heart sounds: No friction rub  No gallop  Comments: No edema  Pulmonary:      Effort: Pulmonary effort is normal  No respiratory distress  Breath sounds: No wheezing, rhonchi or rales  Abdominal:      General: Bowel sounds are normal  There is no distension  Palpations: Abdomen is soft  Tenderness: There is no abdominal tenderness  There is no rebound  Musculoskeletal:      Cervical back: Normal range of motion and neck supple  Neurological:      General: No focal deficit present  Mental Status: She is alert and oriented to person, place, and time  Mental status is at baseline  Psychiatric:         Mood and Affect: Mood normal          Behavior: Behavior normal          Thought Content:  Thought content normal          Judgment: Judgment normal          Medications:    Current Outpatient Medications:     multivitamin (THERAGRAN) TABS, Take 1 tablet by mouth as needed When patient remembers to take them, Disp: , Rfl:     olmesartan (BENICAR) 5 mg tablet, TAKE 2 TABLETS BY MOUTH EVERY DAY, Disp: 180 tablet, Rfl: 1    Laboratory Results:  Lab Results   Component Value Date    WBC 4 7 11/08/2021    HGB 12 1 11/08/2021    HCT 37 7 11/08/2021    MCV 91 5 11/08/2021     11/08/2021     Lab Results   Component Value Date    SODIUM 138 09/21/2022    K 4 9 09/21/2022     (H) 09/21/2022    CO2 26 09/21/2022    BUN 14 09/21/2022    CREATININE 0 79 09/21/2022    GLUC 93 11/08/2021    CALCIUM 9 1 09/21/2022     Lab Results   Component Value Date    CALCIUM 9 1 09/21/2022     No results found for: LABPROT

## 2022-09-30 NOTE — PATIENT INSTRUCTIONS
You are here for follow-up it was wonderful to see you in here how your family is doing  Your creatinine level is still normal at 0 6 so there has been no change or worsening of your kidney function  As we talked you did check the urine for protein and it still extremely low and it would not require any changes in your diet or medication  The olmesartan is a blood pressure pill it helps lower protein in the urine since your asking  I looked at your lipid profile in November was excellent as your good cholesterol is actually higher than her bad cholesterol which is terrific    For now no changes in medications  Labs and follow-up as scheduled and you can always call me if there is any questions or concerns before next visit

## 2022-11-09 ENCOUNTER — OFFICE VISIT (OUTPATIENT)
Dept: FAMILY MEDICINE CLINIC | Facility: CLINIC | Age: 47
End: 2022-11-09

## 2022-11-09 VITALS
BODY MASS INDEX: 26.84 KG/M2 | HEART RATE: 74 BPM | DIASTOLIC BLOOD PRESSURE: 82 MMHG | RESPIRATION RATE: 16 BRPM | OXYGEN SATURATION: 97 % | HEIGHT: 67 IN | WEIGHT: 171 LBS | SYSTOLIC BLOOD PRESSURE: 128 MMHG

## 2022-11-09 DIAGNOSIS — Z00.00 WELL ADULT EXAM: ICD-10-CM

## 2022-11-09 DIAGNOSIS — I10 PRIMARY HYPERTENSION: ICD-10-CM

## 2022-11-09 DIAGNOSIS — Z12.11 SCREENING FOR MALIGNANT NEOPLASM OF COLON: ICD-10-CM

## 2022-11-09 DIAGNOSIS — E55.9 VITAMIN D DEFICIENCY: Primary | ICD-10-CM

## 2022-11-09 DIAGNOSIS — R53.83 FATIGUE, UNSPECIFIED TYPE: ICD-10-CM

## 2022-11-09 DIAGNOSIS — E61.1 IRON DEFICIENCY: ICD-10-CM

## 2022-11-09 DIAGNOSIS — E53.8 B12 DEFICIENCY: ICD-10-CM

## 2022-11-09 DIAGNOSIS — R23.2 HOT FLASHES: ICD-10-CM

## 2022-11-09 DIAGNOSIS — Z12.31 ENCOUNTER FOR SCREENING MAMMOGRAM FOR BREAST CANCER: ICD-10-CM

## 2022-11-09 DIAGNOSIS — Q61.3 POLYCYSTIC KIDNEY DISEASE: ICD-10-CM

## 2022-11-09 NOTE — PROGRESS NOTES
Assessment/Plan:  Continue with Nephrology and continue with the olmesartan blood pressure is controlled for the fatigue we will work her up she is having some all ago menorrhea and more than likely is pre menopausal her mother was around the same age I did advise her to exercise and eat healthy in the meantime      1  Vitamin D deficiency  -     Vitamin D 25 hydroxy; Future    2  Fatigue, unspecified type  -     CBC and differential; Future  -     TSH, 3rd generation with Free T4 reflex; Future    3  B12 deficiency  -     Vitamin B12; Future    4  Well adult exam  -     Lipid Panel with Direct LDL reflex; Future    5  Iron deficiency  -     Iron Panel (Includes Ferritin, Iron Sat%, Iron, and TIBC); Future    6  Hot flashes  -     Follicle stimulating hormone; Future    7  Encounter for screening mammogram for breast cancer  -     Mammo screening bilateral w 3d & cad; Future; Expected date: 11/09/2022    8  Screening for malignant neoplasm of colon  -     Cologuard    9  Polycystic kidney disease    10  Primary hypertension    11  BMI 26 0-26 9,adult       Subjective:      Patient ID: Brian Seaman is a 49 y o  female  HPI   Here to go over chronic issues and labs / imaging studies if applicable  Yearly physical         The following portions of the patient's history were reviewed and updated as appropriate: allergies, current medications, past family history, past medical history, past social history, past surgical history and problem list     Review of Systems   Constitutional: Negative for fever and unexpected weight change  HENT: Negative for nosebleeds and trouble swallowing  Eyes: Negative for visual disturbance  Respiratory: Negative for chest tightness and shortness of breath  Cardiovascular: Negative for chest pain, palpitations and leg swelling  Gastrointestinal: Negative for abdominal pain, constipation, diarrhea and nausea  Endocrine: Negative for cold intolerance  Genitourinary: Negative for dysuria and urgency  Musculoskeletal: Negative for joint swelling and myalgias  Skin: Negative for rash  Neurological: Negative for tremors, seizures and syncope  Hematological: Does not bruise/bleed easily  Psychiatric/Behavioral: Negative for hallucinations and suicidal ideas  Objective:      /82 (BP Location: Right arm, Patient Position: Sitting, Cuff Size: Standard)   Pulse 74   Resp 16   Ht 5' 7" (1 702 m)   Wt 77 6 kg (171 lb)   SpO2 97%   BMI 26 78 kg/m²     No visits with results within 2 Week(s) from this visit  Latest known visit with results is:   Appointment on 09/21/2022   Component Date Value   • Sodium 09/21/2022 138    • Potassium 09/21/2022 4 9    • Chloride 09/21/2022 110 (A)   • CO2 09/21/2022 26    • ANION GAP 09/21/2022 2 (A)   • BUN 09/21/2022 14    • Creatinine 09/21/2022 0 79    • Glucose, Fasting 09/21/2022 93    • Calcium 09/21/2022 9 1    • eGFR 09/21/2022 88           Physical Exam  Vitals and nursing note reviewed  Constitutional:       Appearance: She is well-developed  HENT:      Head: Normocephalic and atraumatic  Cardiovascular:      Rate and Rhythm: Normal rate and regular rhythm  Heart sounds: Normal heart sounds  No murmur heard  Pulmonary:      Effort: Pulmonary effort is normal       Breath sounds: Normal breath sounds  No wheezing or rales  Abdominal:      General: Bowel sounds are normal  There is no distension  Palpations: Abdomen is soft  Tenderness: There is no abdominal tenderness  Musculoskeletal:         General: No tenderness  Normal range of motion  Cervical back: Normal range of motion and neck supple  Lymphadenopathy:      Cervical: No cervical adenopathy  Skin:     General: Skin is warm and dry  Capillary Refill: Capillary refill takes less than 2 seconds  Findings: No rash     Neurological:      Mental Status: She is alert and oriented to person, place, and time       Cranial Nerves: No cranial nerve deficit  Sensory: No sensory deficit  Motor: No abnormal muscle tone  Psychiatric:         Behavior: Behavior normal          Thought Content: Thought content normal          Judgment: Judgment normal        BMI Counseling: Body mass index is 26 78 kg/m²  The BMI is above normal  Nutrition recommendations include decreasing portion sizes, encouraging healthy choices of fruits and vegetables, decreasing fast food intake, consuming healthier snacks and limiting drinks that contain sugar  Exercise recommendations include exercising 3-5 times per week  No pharmacotherapy was ordered  Rationale for BMI follow-up plan is due to patient being overweight or obese  Depression Screening and Follow-up Plan: Patient was screened for depression during today's encounter   They screened negative with a PHQ-2 score of 0         Cameron Lea MD  [place] [address]

## 2022-11-11 ENCOUNTER — APPOINTMENT (OUTPATIENT)
Dept: LAB | Facility: CLINIC | Age: 47
End: 2022-11-11

## 2022-11-11 DIAGNOSIS — R53.83 FATIGUE, UNSPECIFIED TYPE: ICD-10-CM

## 2022-11-11 DIAGNOSIS — E53.8 B12 DEFICIENCY: ICD-10-CM

## 2022-11-11 DIAGNOSIS — Z00.00 WELL ADULT EXAM: ICD-10-CM

## 2022-11-11 DIAGNOSIS — R23.2 HOT FLASHES: ICD-10-CM

## 2022-11-11 DIAGNOSIS — E61.1 IRON DEFICIENCY: ICD-10-CM

## 2022-11-11 DIAGNOSIS — E55.9 VITAMIN D DEFICIENCY: ICD-10-CM

## 2022-11-11 LAB
25(OH)D3 SERPL-MCNC: 14.1 NG/ML (ref 30–100)
BASOPHILS # BLD AUTO: 0.04 THOUSANDS/ÂΜL (ref 0–0.1)
BASOPHILS NFR BLD AUTO: 1 % (ref 0–1)
CHOLEST SERPL-MCNC: 196 MG/DL
EOSINOPHIL # BLD AUTO: 0.11 THOUSAND/ÂΜL (ref 0–0.61)
EOSINOPHIL NFR BLD AUTO: 2 % (ref 0–6)
ERYTHROCYTE [DISTWIDTH] IN BLOOD BY AUTOMATED COUNT: 12.9 % (ref 11.6–15.1)
FERRITIN SERPL-MCNC: 43 NG/ML (ref 8–388)
FSH SERPL-ACNC: 44.6 MIU/ML
HCT VFR BLD AUTO: 40.9 % (ref 34.8–46.1)
HDLC SERPL-MCNC: 105 MG/DL
HGB BLD-MCNC: 12.7 G/DL (ref 11.5–15.4)
IMM GRANULOCYTES # BLD AUTO: 0.01 THOUSAND/UL (ref 0–0.2)
IMM GRANULOCYTES NFR BLD AUTO: 0 % (ref 0–2)
IRON SATN MFR SERPL: 19 % (ref 15–50)
IRON SERPL-MCNC: 67 UG/DL (ref 50–170)
LDLC SERPL CALC-MCNC: 82 MG/DL (ref 0–100)
LYMPHOCYTES # BLD AUTO: 1.75 THOUSANDS/ÂΜL (ref 0.6–4.47)
LYMPHOCYTES NFR BLD AUTO: 30 % (ref 14–44)
MCH RBC QN AUTO: 29.9 PG (ref 26.8–34.3)
MCHC RBC AUTO-ENTMCNC: 31.1 G/DL (ref 31.4–37.4)
MCV RBC AUTO: 96 FL (ref 82–98)
MONOCYTES # BLD AUTO: 0.47 THOUSAND/ÂΜL (ref 0.17–1.22)
MONOCYTES NFR BLD AUTO: 8 % (ref 4–12)
NEUTROPHILS # BLD AUTO: 3.49 THOUSANDS/ÂΜL (ref 1.85–7.62)
NEUTS SEG NFR BLD AUTO: 59 % (ref 43–75)
NRBC BLD AUTO-RTO: 0 /100 WBCS
PLATELET # BLD AUTO: 300 THOUSANDS/UL (ref 149–390)
PMV BLD AUTO: 9.1 FL (ref 8.9–12.7)
RBC # BLD AUTO: 4.25 MILLION/UL (ref 3.81–5.12)
TIBC SERPL-MCNC: 362 UG/DL (ref 250–450)
TRIGL SERPL-MCNC: 47 MG/DL
TSH SERPL DL<=0.05 MIU/L-ACNC: 3.05 UIU/ML (ref 0.45–4.5)
VIT B12 SERPL-MCNC: 510 PG/ML (ref 100–900)
WBC # BLD AUTO: 5.87 THOUSAND/UL (ref 4.31–10.16)

## 2022-11-16 ENCOUNTER — TELEPHONE (OUTPATIENT)
Dept: FAMILY MEDICINE CLINIC | Facility: CLINIC | Age: 47
End: 2022-11-16

## 2022-11-16 DIAGNOSIS — E55.9 VITAMIN D DEFICIENCY: Primary | ICD-10-CM

## 2022-11-16 NOTE — TELEPHONE ENCOUNTER
----- Message from Maxi Joyner MD sent at 11/16/2022 12:26 PM EST -----  Cholesterols are good   B12 is good   Thyroid is good  FSH is way up - so this is perimenopausal  Iron is good enough   Vit D is low low - take vit D 5,000 but 2 of them daily for 2 months then 1 daily after that

## 2022-12-06 ENCOUNTER — OFFICE VISIT (OUTPATIENT)
Dept: OBGYN CLINIC | Facility: CLINIC | Age: 47
End: 2022-12-06

## 2022-12-06 VITALS
SYSTOLIC BLOOD PRESSURE: 142 MMHG | WEIGHT: 175 LBS | HEIGHT: 67 IN | DIASTOLIC BLOOD PRESSURE: 96 MMHG | BODY MASS INDEX: 27.47 KG/M2

## 2022-12-06 DIAGNOSIS — Z12.31 ENCOUNTER FOR SCREENING MAMMOGRAM FOR MALIGNANT NEOPLASM OF BREAST: ICD-10-CM

## 2022-12-06 DIAGNOSIS — Z01.419 WELL WOMAN EXAM WITH ROUTINE GYNECOLOGICAL EXAM: Primary | ICD-10-CM

## 2022-12-06 DIAGNOSIS — Z12.4 ENCOUNTER FOR SCREENING FOR MALIGNANT NEOPLASM OF CERVIX: ICD-10-CM

## 2022-12-06 DIAGNOSIS — Z11.51 SCREENING FOR HPV (HUMAN PAPILLOMAVIRUS): ICD-10-CM

## 2022-12-06 NOTE — PROGRESS NOTES
ASSESSMENT & PLAN: Tabitha Hernandez is a 49 y o  [id number] with normal gynecologic exam     1   Routine well woman exam done today  2    Pap and HPV:Pap with HPV was done today  Current ASCCP Guidelines reviewed  Last Pap  [unfilled] :  no abnormalities  3  Mammogram ordered  Recommend yearly mammography  4   Perimenopausal- discussed what to expect with menopausal transition  Encouraged to call with any questions/ concerns  5  The patient is sexually active  6  The following were reviewed in today's visit: breast self exam, use and side effects of HRT and menopause  7  Patient to return to office in 12 months for WWE  All questions have been answered to her satisfaction  CC:  Annual Gynecologic Examination    HPI: Tabitha Hernandez is a 49 y o  [id number] who presents for annual gynecologic examination  She has the following concerns:  Irregular cycles  Menses were regular, monthly  States they became more frequent for a few months, and now skipping months  Has had some intermittent hot flushes and mood changes  Denies any insomnia or night sweats  Health Maintenance:    She wears her seatbelt routinely  She does perform irregular monthly self breast exams  She feels safe at home  Patients does try to follow a healthy diet  Past Medical History:   Diagnosis Date   • Polycystic kidney disease        Past Surgical History:   Procedure Laterality Date   •  SECTION     • MAMMO (HISTORICAL)         Past OB/Gyn History:  Period Duration (Days): 5  Period Pattern: (!) Irregular  Menstrual Flow: ModeratePatient's last menstrual period was 2022 (approximate)  Menstrual History:  OB History        2    Para   2    Term   1       1    AB        Living   2       SAB        IAB        Ectopic        Multiple        Live Births   2                  Patient's last menstrual period was 2022 (approximate)    Period Duration (Days): 5  Period Pattern: (!) Irregular  Menstrual Flow: Moderate    History of sexually transmitted infection No  Patient is currently sexually active  heterosexual Birth control: none  Last Pap  [unfilled] :  no abnormalities  Family History   Problem Relation Age of Onset   • Hypertension Mother    • Other Mother         kidney stone   • Polycystic kidney disease Mother    • Hypertension Father    • Kidney disease Maternal Grandmother    • Other Maternal Grandmother         Kidney stone   • Brain cancer Paternal Grandfather         unknown age   • No Known Problems Sister    • No Known Problems Daughter    • No Known Problems Son    • No Known Problems Brother    • No Known Problems Brother        Social History:  Social History     Socioeconomic History   • Marital status: /Civil Union     Spouse name: Not on file   • Number of children: Not on file   • Years of education: Not on file   • Highest education level: 12th grade   Occupational History   • Occupation: Retail    Tobacco Use   • Smoking status: Never   • Smokeless tobacco: Never   Vaping Use   • Vaping Use: Never used   Substance and Sexual Activity   • Alcohol use: Yes     Comment: occasionally   • Drug use: No   • Sexual activity: Yes     Partners: Male     Birth control/protection: Condom Male   Other Topics Concern   • Not on file   Social History Narrative    Do you currently or have you served in [employer]: No    Were you activated, into active duty, as a member of the [employer] or as a Reservist: No    Occupation:  retail store    Education: 12    Marital status:     Sexual orientation: Heterosexual    Exercise level: Moderate    Diet: Regular    General stress level: Medium    Alcohol intake: Moderate    Caffeine intake:  Moderate    Chewing tobacco: none    Illicit drugs: none    Seat belts used routinely: Yes    Sunscreen used routinely: Yes    Smoke alarm in home: Yes    Advance directive: No    Salt Intake: minimal    Has the Patient had a mammogram to screen for breast cancer within 24 months: No     Social Determinants of Health     Financial Resource Strain: Not on file   Food Insecurity: Not on file   Transportation Needs: Not on file   Physical Activity: Not on file   Stress: Not on file   Social Connections: Not on file   Intimate Partner Violence: Not on file   Housing Stability: Not on file     Presently lives with spouse  Patient is   Patient is currently employed retail  Allergies   Allergen Reactions   • Prednisone Hypertension       Current Outpatient Medications:   •  multivitamin (THERAGRAN) TABS, Take 1 tablet by mouth as needed When patient remembers to take them, Disp: , Rfl:   •  olmesartan (BENICAR) 5 mg tablet, TAKE 2 TABLETS BY MOUTH EVERY DAY, Disp: 180 tablet, Rfl: 1    Review of Systems:  Review of Systems   Constitutional: Negative for activity change, chills, fever and unexpected weight change  HENT: Negative for congestion, ear pain, hearing loss and sore throat  Respiratory: Negative for cough, chest tightness and shortness of breath  Cardiovascular: Negative for chest pain and leg swelling  Gastrointestinal: Negative for abdominal pain, constipation, diarrhea, nausea and vomiting  Genitourinary: Positive for menstrual problem  Negative for difficulty urinating, dysuria, frequency, pelvic pain, vaginal discharge and vaginal pain  Skin: Negative for color change and rash  Neurological: Negative for dizziness, numbness and headaches  Psychiatric/Behavioral: Negative for agitation and confusion  Physical Exam:  /96 (BP Location: Left arm, Patient Position: Sitting, Cuff Size: Standard)   Ht 5' 7" (1 702 m)   Wt 79 4 kg (175 lb)   LMP 12/02/2022 (Approximate)   BMI 27 41 kg/m²    Physical Exam  Constitutional:       General: She is not in acute distress  Appearance: Normal appearance  She is normal weight     Genitourinary:      Vulva, bladder, rectum and urethral meatus normal       No lesions in the vagina  Right Labia: No rash, tenderness or lesions  Left Labia: No tenderness, lesions or rash  No labial fusion noted  No vaginal discharge or tenderness  No vaginal prolapse present  No vaginal atrophy present  Right Adnexa: not tender, not full and no mass present  Left Adnexa: not tender, not full and no mass present  Cervical polyp present  No cervical motion tenderness or friability  Uterus is not enlarged or prolapsed  Breasts:     Breasts are soft  Right: No inverted nipple, mass, nipple discharge or skin change  Left: No inverted nipple, mass, nipple discharge or skin change  HENT:      Head: Normocephalic and atraumatic  Nose: Nose normal    Eyes:      Conjunctiva/sclera: Conjunctivae normal       Pupils: Pupils are equal, round, and reactive to light  Cardiovascular:      Rate and Rhythm: Normal rate and regular rhythm  Pulses: Normal pulses  Heart sounds: Normal heart sounds  Pulmonary:      Effort: Pulmonary effort is normal  No respiratory distress  Breath sounds: Normal breath sounds  No wheezing  Abdominal:      General: Abdomen is flat  There is no distension  Palpations: Abdomen is soft  Tenderness: There is no abdominal tenderness  There is no guarding  Musculoskeletal:         General: Normal range of motion  Cervical back: Normal range of motion and neck supple  Neurological:      General: No focal deficit present  Mental Status: She is alert and oriented to person, place, and time  Mental status is at baseline  Skin:     General: Skin is warm and dry  Psychiatric:         Mood and Affect: Mood normal          Behavior: Behavior normal          Thought Content: Thought content normal          Judgment: Judgment normal    Vitals and nursing note reviewed

## 2022-12-08 LAB
HPV HR 12 DNA CVX QL NAA+PROBE: NEGATIVE
HPV16 DNA CVX QL NAA+PROBE: NEGATIVE
HPV18 DNA CVX QL NAA+PROBE: NEGATIVE

## 2022-12-15 LAB
LAB AP GYN PRIMARY INTERPRETATION: NORMAL
Lab: NORMAL
PATH INTERP SPEC-IMP: NORMAL

## 2022-12-23 LAB — COLOGUARD RESULT REPORTABLE: NEGATIVE

## 2022-12-30 ENCOUNTER — HOSPITAL ENCOUNTER (OUTPATIENT)
Dept: RADIOLOGY | Facility: HOSPITAL | Age: 47
Discharge: HOME | End: 2022-12-30

## 2022-12-30 VITALS — BODY MASS INDEX: 27.47 KG/M2 | HEIGHT: 67 IN | WEIGHT: 175 LBS

## 2022-12-30 DIAGNOSIS — Z12.31 ENCOUNTER FOR SCREENING MAMMOGRAM FOR BREAST CANCER: ICD-10-CM

## 2023-01-03 ENCOUNTER — TELEPHONE (OUTPATIENT)
Dept: FAMILY MEDICINE CLINIC | Facility: CLINIC | Age: 48
End: 2023-01-03

## 2023-01-04 ENCOUNTER — TELEPHONE (OUTPATIENT)
Dept: FAMILY MEDICINE CLINIC | Facility: CLINIC | Age: 48
End: 2023-01-04

## 2023-01-04 NOTE — TELEPHONE ENCOUNTER
----- Message from Perfecto Alonzo MD sent at 1/4/2023 10:34 AM EST -----  Mammo neg   Cont with routine screening yearly

## 2023-01-17 ENCOUNTER — DOCUMENTATION (OUTPATIENT)
Dept: NEPHROLOGY | Facility: CLINIC | Age: 48
End: 2023-01-17

## 2023-01-17 NOTE — PROGRESS NOTES
Sent recall card for July appointment with Dr Sue Garcia in [facility]  This is the 1st attempt

## 2023-02-09 ENCOUNTER — DOCUMENTATION (OUTPATIENT)
Dept: NEPHROLOGY | Facility: CLINIC | Age: 48
End: 2023-02-09

## 2023-02-09 NOTE — PROGRESS NOTES
Sent [employer] message to schedule July follow up appointment with Dr Jeanine Sotelo in [place] [address]  This is the 2nd attempt

## 2023-02-20 ENCOUNTER — TELEPHONE (OUTPATIENT)
Dept: NEPHROLOGY | Facility: CLINIC | Age: 48
End: 2023-02-20

## 2023-04-04 DIAGNOSIS — I10 ESSENTIAL HYPERTENSION: ICD-10-CM

## 2023-04-05 RX ORDER — OLMESARTAN MEDOXOMIL 5 MG/1
TABLET ORAL
Qty: 180 TABLET | Refills: 1 | Status: SHIPPED | OUTPATIENT
Start: 2023-04-05

## 2023-08-25 NOTE — TELEPHONE ENCOUNTER
Vicente Haley to call theoffice to schedule a follow up appt  This is the third call 
Statement Selected

## 2023-10-16 DIAGNOSIS — I10 ESSENTIAL HYPERTENSION: ICD-10-CM

## 2023-10-17 RX ORDER — OLMESARTAN MEDOXOMIL 5 MG/1
TABLET ORAL
Qty: 180 TABLET | Refills: 1 | Status: SHIPPED | OUTPATIENT
Start: 2023-10-17

## 2023-11-02 ENCOUNTER — TELEPHONE (OUTPATIENT)
Dept: NEPHROLOGY | Facility: CLINIC | Age: 48
End: 2023-11-02

## 2023-11-02 NOTE — TELEPHONE ENCOUNTER
Patient called asking for an appointment with Dr. Monalisa Rodriguez. I checked the [place] ([place] and [facility] office, neither has any appointments open. Patient does not wish to be seen by an AP. I placed the patient on the recall list as well as the wait list for both offices.

## 2023-11-10 ENCOUNTER — OFFICE VISIT (OUTPATIENT)
Dept: FAMILY MEDICINE CLINIC | Facility: CLINIC | Age: 48
End: 2023-11-10
Payer: COMMERCIAL

## 2023-11-10 VITALS
OXYGEN SATURATION: 98 % | BODY MASS INDEX: 26.68 KG/M2 | RESPIRATION RATE: 16 BRPM | DIASTOLIC BLOOD PRESSURE: 82 MMHG | SYSTOLIC BLOOD PRESSURE: 124 MMHG | HEART RATE: 71 BPM | WEIGHT: 170 LBS | HEIGHT: 67 IN

## 2023-11-10 DIAGNOSIS — I15.1 HYPERTENSION SECONDARY TO OTHER RENAL DISORDERS: ICD-10-CM

## 2023-11-10 DIAGNOSIS — E61.1 IRON DEFICIENCY: ICD-10-CM

## 2023-11-10 DIAGNOSIS — Z12.31 ENCOUNTER FOR SCREENING MAMMOGRAM FOR BREAST CANCER: ICD-10-CM

## 2023-11-10 DIAGNOSIS — E55.9 VITAMIN D DEFICIENCY: ICD-10-CM

## 2023-11-10 DIAGNOSIS — E53.8 B12 DEFICIENCY: ICD-10-CM

## 2023-11-10 DIAGNOSIS — Q61.3 POLYCYSTIC KIDNEY DISEASE: ICD-10-CM

## 2023-11-10 DIAGNOSIS — N95.1 PERIMENOPAUSAL: ICD-10-CM

## 2023-11-10 DIAGNOSIS — Z00.00 WELL ADULT EXAM: Primary | ICD-10-CM

## 2023-11-10 PROCEDURE — 99396 PREV VISIT EST AGE 40-64: CPT | Performed by: FAMILY MEDICINE

## 2023-11-10 NOTE — PROGRESS NOTES
Assessment/Plan:  1. Well adult exam  -     Lipid Panel with Direct LDL reflex; Future    2. BMI 26.0-26.9,adult    3. Perimenopausal  -     FSH and LH; Future  -     TSH, 3rd generation with Free T4 reflex; Future  -     CBC and differential; Future  -     Comprehensive metabolic panel; Future    4. B12 deficiency  -     Vitamin B12; Future    5. Vitamin D deficiency  -     Vitamin D 25 hydroxy; Future    6. Iron deficiency  -     Iron Panel (Includes Ferritin, Iron Sat%, Iron, and TIBC); Future    7. Polycystic kidney disease  -     Albumin / creatinine urine ratio; Future  -     Protein, Total w/Creat, Random Urine  -     US kidney and bladder; Future; Expected date: 11/10/2023    8. Encounter for screening mammogram for breast cancer  -     Mammo screening bilateral w 3d & cad; Future; Expected date: 11/10/2023    9. Hypertension secondary to other renal disorders      Healthcare maintenance. Ordered a blood test and urine test due to slightly low iron levels, including FSH, thyroid, cholesterol, vitamin D, blood count, and kidney/liver function and electrolyte. Ordered mammogram due in 12/2023. Advised to continue with the exercise. Instructed to adhere to dietary recommendations for her kidney problem. Ordered ultrasound for the kidneys. Subjective:      Patient ID: Leida Rollins is a 51 y.o. female. Patient here for a comprehensive physical exam. The patient has a history of polycystic kidney disease, nephrolithiasis, hypertension, B12 deficiency, vitamin D deficiency, currently on Benicar 5 mg and a multivitamin. She is here for a physical exam.    The patient consents to the use of [address] [place] [address] services today. The patient notes that she is doing okay. She notes that there are some changes as she approaches her 49th year. The patient notes feeling a bit tired and telles. She has transitioned into the edwar-menopausal stage of life. Her last menstruation was in 05/2023.   She notes that hot flashes are occurring intermittently, likely due to menopausal transition. The patient expresses interest in scheduling an appointment at [facility]. She has not done the self-breast examination recommended for her appointment. The patient notes work-related physical activity and interpersonal challenges may contribute to her health concerns. The patient notes occasional increased appetite. She reports discomfort in the abdominal area, describing possible hormonal influence. The patient denies experiencing palpitations, chest pain, or shortness of breath. She is under the care of [facility]. The patient notes using reading glasses. She notes that her bowel movements are normal every day. She has no pain in her knee. She notes that she always has the pressure with socks for the kidneys and for walking, supporting her legs. The patient's medicine was refilled. Diet and Physical Activity  - Diet/Nutrition: She has been eating right. - Exercise: She is trying to stay active by hiking. General Health  - Sleep: She notes that she usually sleeps well, and tea helps a lot. - Vision: She notes changes in vision, affecting her eyes due to age and pressure. - Dental: The patient notes having a dentist checkup scheduled for 11/21/2023. The following portions of the patient's history were reviewed and updated as appropriate: allergies, current medications, past family history, past medical history, past social history, past surgical history and problem list.    Review of Systems   Constitutional: Negative for fever and unexpected weight change. HENT: Negative for nosebleeds and trouble swallowing. Eyes: Negative for visual disturbance. Respiratory: Negative for chest tightness and shortness of breath. Cardiovascular: Negative for chest pain, palpitations and leg swelling. Gastrointestinal: Negative for abdominal pain, constipation, diarrhea and nausea.   Endocrine: Negative for cold intolerance. Genitourinary: Negative for dysuria and urgency. Musculoskeletal: Negative for joint swelling and myalgias. Skin: Negative for rash. Neurological: Negative for tremors, seizures and syncope. Hematological: Does not bruise/bleed easily. Psychiatric/Behavioral: Negative for hallucinations and suicidal ideas. Objective:     /82 (BP Location: Left arm, Patient Position: Sitting, Cuff Size: Standard)   Pulse 71   Resp 16   Ht 5' 7" (1.702 m)   Wt 77.1 kg (170 lb)   SpO2 98%   BMI 26.63 kg/m²    Physical Exam  Vitals and nursing note reviewed. Constitutional:     Appearance: Well-developed. HENT:     Head: Normocephalic and atraumatic. Cardiovascular:     Rate and Rhythm: Normal rate and regular rhythm. Heart sounds: Normal heart sounds. No murmur heard. Pulmonary:     Effort: Pulmonary effort is normal.     Breath sounds: Normal breath sounds. No wheezing or rales. Abdominal:     General: Bowel sounds are normal. There is no distension. Palpations: Abdomen is soft. Tenderness: There is no abdominal tenderness. Musculoskeletal:     General: No tenderness. Normal range of motion. Cervical back: Normal range of motion and neck supple. Lymphadenopathy:     Cervical: No cervical adenopathy. Skin:     General: Skin is warm and dry. Capillary Refill: Capillary refill takes less than 2 seconds. Findings: No rash. Neurological:     Mental Status: Alert and oriented to person, place, and time. Cranial Nerves: No cranial nerve deficit. Sensory: No sensory deficit. Motor: No abnormal muscle tones. Psychiatric:     Behavior: Behavior normal.     Thought Content: Thought content normal.     Judgment: Judgment normal.      No visits with results within 2 Week(s) from this visit.    Latest known visit with results is:   Office Visit on 12/06/2022   Component Date Value   • Case Report 12/06/2022                      Value:Gynecologic Cytology Report                       Case: Omar Buckner Provider:  Melvin Delcid MD     Collected:           12/06/2022 0954              Ordering Location:     [facility]  Received:            12/06/2022 [address] [place] [address] Screen:          Fabby Jordan                                                               Pathologist:           Young Cespedes MD                                                                 Specimen:    LIQUID-BASED PAP, SCREENING, Cervix                                                       • Primary Interpretation 12/06/2022 Other    • Interpretation 12/06/2022 Endometrial cells present in a woman age 39 or above. See Note. • Specimen Adequacy 12/06/2022 Satisfactory for evaluation. Endocervical/transformation zone component present. • Note 12/06/2022                      Value: This result contains rich text formatting which cannot be displayed here. • Additional Information 12/06/2022                      Value: This result contains rich text formatting which cannot be displayed here. • HPV Other HR 12/06/2022 Negative    • HPV16 12/06/2022 Negative    • HPV18 12/06/2022 Negative      I have personally reviewed results with the patient. The patient had low iron levels, while vitamin B12 levels were normal last year. The patient did the Cologuard kit and its negative. Pap smear and HPV results normal, up to date in this time. BMI Counseling: Body mass index is 26.63 kg/m². The BMI is above normal. Nutrition recommendations include decreasing portion sizes, encouraging healthy choices of fruits and vegetables, decreasing fast food intake, consuming healthier snacks and limiting drinks that contain sugar.  Exercise recommendations include exercising 3-5 times per week. No pharmacotherapy was ordered. Rationale for BMI follow-up plan is due to patient being overweight or obese. Depression Screening and Follow-up Plan: Patient was screened for depression during today's encounter. They screened negative with a PHQ-2 score of 0.        Floyd Junior MD   [address]     Transcribed for Floyd Junior MD, by [address] on 11/12/23 at 3:22 PM. Powered by "[employer] Daniel.

## 2023-11-20 ENCOUNTER — APPOINTMENT (OUTPATIENT)
Dept: LAB | Facility: CLINIC | Age: 48
End: 2023-11-20
Payer: COMMERCIAL

## 2023-11-20 ENCOUNTER — HOSPITAL ENCOUNTER (OUTPATIENT)
Dept: ULTRASOUND IMAGING | Facility: HOSPITAL | Age: 48
Discharge: HOME | End: 2023-11-20
Attending: FAMILY MEDICINE
Payer: COMMERCIAL

## 2023-11-20 DIAGNOSIS — E61.1 IRON DEFICIENCY: ICD-10-CM

## 2023-11-20 DIAGNOSIS — E53.8 B12 DEFICIENCY: ICD-10-CM

## 2023-11-20 DIAGNOSIS — E55.9 VITAMIN D DEFICIENCY: ICD-10-CM

## 2023-11-20 DIAGNOSIS — Q61.3 POLYCYSTIC KIDNEY DISEASE: ICD-10-CM

## 2023-11-20 DIAGNOSIS — Z00.00 WELL ADULT EXAM: ICD-10-CM

## 2023-11-20 DIAGNOSIS — N95.1 PERIMENOPAUSAL: ICD-10-CM

## 2023-11-20 LAB
25(OH)D3 SERPL-MCNC: 20.4 NG/ML (ref 30–100)
ALBUMIN SERPL BCP-MCNC: 4.4 G/DL (ref 3.5–5)
ALP SERPL-CCNC: 59 U/L (ref 34–104)
ALT SERPL W P-5'-P-CCNC: 11 U/L (ref 7–52)
ANION GAP SERPL CALCULATED.3IONS-SCNC: 8 MMOL/L
AST SERPL W P-5'-P-CCNC: 17 U/L (ref 13–39)
BASOPHILS # BLD AUTO: 0.03 THOUSANDS/ÂΜL (ref 0–0.1)
BASOPHILS NFR BLD AUTO: 1 % (ref 0–1)
BILIRUB SERPL-MCNC: 0.6 MG/DL (ref 0.2–1)
BUN SERPL-MCNC: 15 MG/DL (ref 5–25)
CALCIUM SERPL-MCNC: 9.6 MG/DL (ref 8.4–10.2)
CHLORIDE SERPL-SCNC: 106 MMOL/L (ref 96–108)
CHOLEST SERPL-MCNC: 222 MG/DL
CO2 SERPL-SCNC: 27 MMOL/L (ref 21–32)
CREAT SERPL-MCNC: 0.72 MG/DL (ref 0.6–1.3)
CREAT UR-MCNC: 37.7 MG/DL
CREAT UR-MCNC: 37.7 MG/DL
EOSINOPHIL # BLD AUTO: 0.09 THOUSAND/ÂΜL (ref 0–0.61)
EOSINOPHIL NFR BLD AUTO: 2 % (ref 0–6)
ERYTHROCYTE [DISTWIDTH] IN BLOOD BY AUTOMATED COUNT: 13.2 % (ref 11.6–15.1)
FERRITIN SERPL-MCNC: 40 NG/ML (ref 11–307)
FSH SERPL-ACNC: 97.1 MIU/ML
GFR SERPL CREATININE-BSD FRML MDRD: 98 ML/MIN/1.73SQ M
GLUCOSE P FAST SERPL-MCNC: 91 MG/DL (ref 65–99)
HCT VFR BLD AUTO: 40.7 % (ref 34.8–46.1)
HDLC SERPL-MCNC: 107 MG/DL
HGB BLD-MCNC: 12.9 G/DL (ref 11.5–15.4)
IMM GRANULOCYTES # BLD AUTO: 0.01 THOUSAND/UL (ref 0–0.2)
IMM GRANULOCYTES NFR BLD AUTO: 0 % (ref 0–2)
IRON SATN MFR SERPL: 23 % (ref 15–50)
IRON SERPL-MCNC: 89 UG/DL (ref 50–212)
LDLC SERPL CALC-MCNC: 105 MG/DL (ref 0–100)
LH SERPL-ACNC: 46.9 MIU/ML
LYMPHOCYTES # BLD AUTO: 1.53 THOUSANDS/ÂΜL (ref 0.6–4.47)
LYMPHOCYTES NFR BLD AUTO: 32 % (ref 14–44)
MCH RBC QN AUTO: 29.7 PG (ref 26.8–34.3)
MCHC RBC AUTO-ENTMCNC: 31.7 G/DL (ref 31.4–37.4)
MCV RBC AUTO: 94 FL (ref 82–98)
MICROALBUMIN UR-MCNC: 13.3 MG/L
MICROALBUMIN/CREAT 24H UR: 35 MG/G CREATININE (ref 0–30)
MONOCYTES # BLD AUTO: 0.46 THOUSAND/ÂΜL (ref 0.17–1.22)
MONOCYTES NFR BLD AUTO: 10 % (ref 4–12)
NEUTROPHILS # BLD AUTO: 2.61 THOUSANDS/ÂΜL (ref 1.85–7.62)
NEUTS SEG NFR BLD AUTO: 55 % (ref 43–75)
NRBC BLD AUTO-RTO: 0 /100 WBCS
PLATELET # BLD AUTO: 296 THOUSANDS/UL (ref 149–390)
PMV BLD AUTO: 9.6 FL (ref 8.9–12.7)
POTASSIUM SERPL-SCNC: 3.9 MMOL/L (ref 3.5–5.3)
PROT SERPL-MCNC: 7.4 G/DL (ref 6.4–8.4)
PROT UR-MCNC: <4 MG/DL
RBC # BLD AUTO: 4.35 MILLION/UL (ref 3.81–5.12)
SODIUM SERPL-SCNC: 141 MMOL/L (ref 135–147)
TIBC SERPL-MCNC: 391 UG/DL (ref 250–450)
TRIGL SERPL-MCNC: 51 MG/DL
TSH SERPL DL<=0.05 MIU/L-ACNC: 3.12 UIU/ML (ref 0.45–4.5)
UIBC SERPL-MCNC: 302 UG/DL (ref 155–355)
VIT B12 SERPL-MCNC: 363 PG/ML (ref 180–914)
WBC # BLD AUTO: 4.73 THOUSAND/UL (ref 4.31–10.16)

## 2023-11-20 PROCEDURE — 83002 ASSAY OF GONADOTROPIN (LH): CPT

## 2023-11-20 PROCEDURE — 80061 LIPID PANEL: CPT

## 2023-11-20 PROCEDURE — 36415 COLL VENOUS BLD VENIPUNCTURE: CPT

## 2023-11-20 PROCEDURE — 76775 US EXAM ABDO BACK WALL LIM: CPT

## 2023-11-20 PROCEDURE — 82043 UR ALBUMIN QUANTITATIVE: CPT

## 2023-11-20 PROCEDURE — 82607 VITAMIN B-12: CPT

## 2023-11-20 PROCEDURE — 83540 ASSAY OF IRON: CPT

## 2023-11-20 PROCEDURE — 85025 COMPLETE CBC W/AUTO DIFF WBC: CPT

## 2023-11-20 PROCEDURE — 82570 ASSAY OF URINE CREATININE: CPT

## 2023-11-20 PROCEDURE — 83001 ASSAY OF GONADOTROPIN (FSH): CPT

## 2023-11-20 PROCEDURE — 82728 ASSAY OF FERRITIN: CPT

## 2023-11-20 PROCEDURE — 83550 IRON BINDING TEST: CPT

## 2023-11-20 PROCEDURE — 84443 ASSAY THYROID STIM HORMONE: CPT

## 2023-11-20 PROCEDURE — 82306 VITAMIN D 25 HYDROXY: CPT

## 2023-11-20 PROCEDURE — 84156 ASSAY OF PROTEIN URINE: CPT

## 2023-11-20 PROCEDURE — 80053 COMPREHEN METABOLIC PANEL: CPT

## 2023-11-21 ENCOUNTER — TELEPHONE (OUTPATIENT)
Dept: FAMILY MEDICINE CLINIC | Facility: CLINIC | Age: 48
End: 2023-11-21

## 2023-11-21 NOTE — TELEPHONE ENCOUNTER
----- Message from Dusty Talbert MD sent at 11/21/2023  4:46 AM EST -----  Take vit D 5,000iu daily   Cholesterols are slightly high - just watch the diet   Urine protein is the same   Thyroid is good   Take b12 500mcg daily   Looks like perimenopause on the hormones     Rest is fine

## 2023-11-29 ENCOUNTER — TELEPHONE (OUTPATIENT)
Dept: FAMILY MEDICINE CLINIC | Facility: CLINIC | Age: 48
End: 2023-11-29

## 2023-11-29 NOTE — TELEPHONE ENCOUNTER
----- Message from Papo San MD sent at 11/28/2023  4:43 AM EST -----  No major changes from what I can see just follow-up with the nephrologist

## 2023-12-20 ENCOUNTER — TELEPHONE (OUTPATIENT)
Dept: NEPHROLOGY | Facility: CLINIC | Age: 48
End: 2023-12-20

## 2023-12-29 ENCOUNTER — OFFICE VISIT (OUTPATIENT)
Dept: URGENT CARE | Facility: CLINIC | Age: 48
End: 2023-12-29
Payer: COMMERCIAL

## 2023-12-29 VITALS
DIASTOLIC BLOOD PRESSURE: 108 MMHG | BODY MASS INDEX: 26.37 KG/M2 | RESPIRATION RATE: 14 BRPM | OXYGEN SATURATION: 99 % | SYSTOLIC BLOOD PRESSURE: 168 MMHG | HEIGHT: 67 IN | OXYGEN SATURATION: 99 % | TEMPERATURE: 96.4 F | WEIGHT: 168 LBS | TEMPERATURE: 96.4 F | BODY MASS INDEX: 26.37 KG/M2 | WEIGHT: 168 LBS | HEIGHT: 67 IN | HEART RATE: 66 BPM | SYSTOLIC BLOOD PRESSURE: 168 MMHG | HEART RATE: 66 BPM | RESPIRATION RATE: 14 BRPM | DIASTOLIC BLOOD PRESSURE: 108 MMHG

## 2023-12-29 DIAGNOSIS — I10 ELEVATED BLOOD PRESSURE READING WITH DIAGNOSIS OF HYPERTENSION: Primary | ICD-10-CM

## 2023-12-29 PROCEDURE — 99213 OFFICE O/P EST LOW 20 MIN: CPT | Performed by: NURSE PRACTITIONER

## 2023-12-29 RX ORDER — OLMESARTAN MEDOXOMIL 5 MG/1
10 TABLET ORAL DAILY
COMMUNITY
Start: 2023-11-21

## 2023-12-29 NOTE — PROGRESS NOTES
[facility]        NAME: Cara Henderson is a 48 y.o. female  : 1975    MRN: [id number]  DATE: 2023  TIME: 5:13 PM    Assessment and Plan   Elevated blood pressure reading with diagnosis of hypertension [I10]  1. Elevated blood pressure reading with diagnosis of hypertension          --No red flag symptoms noted at this time.  Address per below.      Patient Instructions     --Continue daily antihypertensive  --Continue stress relieving measures  --Consider daily magnesium supplement  --Follow-up with PCP and/or nephrologist regarding ongoing BP issues for consideration of additional secondary workup.  Go to ER if readings > 180/110 or you develop associated chest pain, shortness of breath, dizziness, vision changes, severe headaches.      Chief Complaint     Chief Complaint   Patient presents with    Hypertension     Pt reports of worsening hypertension x 2 days.         History of Present Illness       Here with concerns about elevated blood pressure.    States she has been on low dose anti-hypertensive (Benicar 5 mg two tabs daily) for couple years with well controlled readings. Over the past few weeks, however, she notes home arm readings are often high (highest was 185/105).  Admits to increased stress lately, but doesn't think that this is the only factor.    Denies recent diet changes.  No excess alcohol use.    Denies sleep apnea.    States she had normal blood work, including renal function, in the past month or so.    Migraine at present which is not unusual for her.    Denies vision changes, chest pain, dizziness, shortness of breath.             Review of Systems   Review of Systems   Constitutional:  Negative for fever.   Eyes:  Negative for visual disturbance.   Respiratory:  Negative for shortness of breath.    Cardiovascular:  Negative for chest pain.   Gastrointestinal:  Negative for nausea and vomiting.   Neurological:  Positive for headaches. Negative for  "dizziness.         Current Medications       Current Outpatient Medications:     olmesartan (BENICAR) 5 mg tablet, Take 10 mg by mouth daily, Disp: , Rfl:     Current Allergies     Allergies as of 12/29/2023 - Reviewed 12/29/2023   Allergen Reaction Noted    Prednisone Palpitations 12/29/2023            The following portions of the patient's history were reviewed and updated as appropriate: allergies, current medications, past family history, past medical history, past social history, past surgical history and problem list.     Past Medical History:   Diagnosis Date    Hypertension        History reviewed. No pertinent surgical history.    History reviewed. No pertinent family history.      Medications have been verified.        Objective   BP (!) 160/110   Pulse 66   Temp (!) 96.4 °F (35.8 °C)   Resp 14   Ht 5' 7\" (1.702 m)   Wt 76.2 kg (168 lb)   SpO2 99%   BMI 26.31 kg/m²   No LMP recorded.       Physical Exam     Physical Exam  Neck:      Comments: No thyroidmegaly or tenderness.    Cardiovascular:      Rate and Rhythm: Normal rate and regular rhythm.   Pulmonary:      Effort: Pulmonary effort is normal.      Breath sounds: Normal breath sounds.   Musculoskeletal:      Cervical back: No tenderness.   Neurological:      Mental Status: She is alert.   Psychiatric:         Mood and Affect: Mood normal.                   "

## 2023-12-29 NOTE — LETTER
December 29, 2023     Patient: Cara Henderson   YOB: 1975   Date of Visit: 12/29/2023       To Whom it May Concern:    Cara Henderson was seen in my clinic on 12/29/2023. Please excuse from work today and tomorrow due to illness.      If you have any questions or concerns, please don't hesitate to call.         Sincerely,          TAMMY Freedman        CC: No Recipients

## 2023-12-29 NOTE — PATIENT INSTRUCTIONS
--Continue daily antihypertensive  --Continue stress relieving measures  --Consider daily magnesium supplement  --Follow-up with PCP and/or nephrologist regarding ongoing BP issues for consideration of additional secondary workup.  Go to ER if readings > 180/110 or you develop associated chest pain, shortness of breath, dizziness, vision changes, severe headaches.

## 2024-01-03 ENCOUNTER — TELEPHONE (OUTPATIENT)
Age: 49
End: 2024-01-03

## 2024-01-03 NOTE — TELEPHONE ENCOUNTER
The patient called she would like a appointment with Dr Rony merritt   she does not want to see anyone else    ---she is having problems controlling her blood pressure it has been running high even with the medication    please call her thank you

## 2024-01-05 ENCOUNTER — OFFICE VISIT (OUTPATIENT)
Dept: FAMILY MEDICINE CLINIC | Facility: CLINIC | Age: 49
End: 2024-01-05

## 2024-01-05 VITALS
SYSTOLIC BLOOD PRESSURE: 160 MMHG | OXYGEN SATURATION: 99 % | HEIGHT: 67 IN | HEART RATE: 58 BPM | BODY MASS INDEX: 26.68 KG/M2 | WEIGHT: 170 LBS | RESPIRATION RATE: 16 BRPM | DIASTOLIC BLOOD PRESSURE: 100 MMHG

## 2024-01-05 DIAGNOSIS — Q61.3 POLYCYSTIC KIDNEY DISEASE: ICD-10-CM

## 2024-01-05 DIAGNOSIS — I15.1 HYPERTENSION SECONDARY TO OTHER RENAL DISORDERS: Primary | ICD-10-CM

## 2024-01-05 NOTE — PROGRESS NOTES
Assessment/Plan:  1. Hypertension secondary to other renal disorders  Assessment & Plan:  The patient exhibits persistent diastolic.hypertension, indicative of systemic stress.   Potential causes include stress, pain, dietary factors, high sodium intake, poor nutrition, and alcohol consumption.   I advised to continue her AG1 supplement.   I instructed the dosage of Benicar be given  to 10 mg in the morning and 10 mg in the evening, with a gradual escalation to 15 mg in the AM and 15 mg in the PM. If hypertension persists, a further increase to 20 mg in the morning and 20 mg in the evening may be considered in may.  I  advised to monitor her blood pressure, aiming for a reading below 130/85 mmHg.   She will notify me if her blood pressure remains consistently high.      2. Polycystic kidney disease    Follow-up  The patient will follow up on 01/30/2024.    Subjective:      Patient ID: Sudha Everett is a 48 y.o. female who comes into the clinic for blood pressure check.  She consents to the use of [facility] services.    Hypertension.  She states that blood pressure today was 168/108 mmHg   She is on a regimen of 2 tablets of Olmesartan, 10 mg each morning. Occasionally, she increases her dosage by 5 mg in the evening, followed by an additional 5 mg dose.  She has been on antihypertensive medication for an extended period.  She also mentions that she has been monitoring her diet and maintaining regular exercise, which has helped stabilize her blood pressure.  She notes that she initially believed she no longer required her medication. However, she resumed taking it over the past 2 to 3 months due to escalating blood pressure.   She consistently monitors her blood pressure but has been unable to maintain a healthy lifestyle.  She reports diastolic readings consistently exceeding 100 mmHg, 110 mmHg, or 114 mmHg.  She noted a blood pressure reading of 176/114 mmHg after returning from work the previous day.  She  experiences headaches, primarily due to increased blood pressure.   She has also reported a decline in her vision.    Wellness.  She expresses concern about potential blood clot, menopause, and elevated stress levels.  She has been taking AG1 supplement for a year and is worried about possible adverse effects.  She reported taking high-dose vitamin B supplements, but her vitamin B levels were not elevated during her last visit with her doctor in 11/2023.  She confirmed that her potassium levels were within the normal range. However, she expressed concern about her slightly elevated cholesterol levels.  She notes that her renal function is normal.  She reports that her blood work was conducted 1.5 months ago.  She confirms that a recent ultrasound was also performed.  She visited urgent care between 2 and 3 days ago due to a migraine episode.        The following portions of the patient's history were reviewed and updated as appropriate: allergies, current medications, past family history, past medical history, past social history, past surgical history and problem list.    Review of Systems   Constitutional: Negative for fever and unexpected weight change.  HENT: Negative for nosebleeds and trouble swallowing.  Eyes: Negative for visual disturbance.  Respiratory: Negative for chest tightness and shortness of breath.  Cardiovascular: Negative for chest pain, palpitations and leg swelling.  Gastrointestinal: Negative for abdominal pain, constipation, diarrhea, and nausea.  Endocrine: Negative for cold intolerance.  Genitourinary: Negative for dysuria and urgency.  Musculoskeletal: Negative for joint swelling and myalgias.  Skin: Negative for rash.  Neurological: Negative for tremors, seizures, and syncope.  Hematological: Does not bruise/bleed easily.  Psychiatric/Behavioral: Negative for hallucinations and suicidal ideas.            Objective:     /100 (BP Location: Left arm, Patient Position: Sitting, Cuff  "Size: Standard)   Pulse 58   Resp 16   Ht 5' 7\" (1.702 m)   Wt 77.1 kg (170 lb)   SpO2 99%   BMI 26.63 kg/m²    Physical Exam  Vitals and nursing note reviewed.  Blood pressure is 168/108 mmHg.  Constitutional:     Appearance: Well-developed.  HENT:     Head: Normocephalic and atraumatic.  Cardiovascular:     Rate and Rhythm: Normal rate and regular rhythm.     Heart sounds: Normal heart sounds. No murmur heard.  Pulmonary:     Effort: Pulmonary effort is normal.     Breath sounds: Normal breath sounds. No wheezing or rales.  Abdominal:     General: Bowel sounds are normal. There is no distension.     Palpations: Abdomen is soft.     Tenderness: There is no abdominal tenderness.  Musculoskeletal:     General: No tenderness. Normal range of motion.     Cervical back: Normal range of motion and neck supple.  Lymphadenopathy:     Cervical: No cervical adenopathy.  Skin:     General: Skin is warm and dry.     Capillary Refill: Capillary refill takes less than 2 seconds.     Findings: No rash.  Neurological:     Mental Status: Alert and oriented to person, place, and time.     Cranial Nerves: No cranial nerve deficit.     Sensory: No sensory deficit.     Motor: No abnormal muscle tone.  Psychiatric:     Behavior: Behavior normal.     Thought Content: Thought content normal.     Judgment: Judgment normal.      No visits with results within 2 Week(s) from this visit.   Latest known visit with results is:   Appointment on 11/20/2023   Component Date Value   • Creatinine, Ur 11/20/2023 37.7    • Albumin,U,Random 11/20/2023 13.3    • Albumin Creat Ratio 11/20/2023 35 (H)    • TSH 3RD GENERATON 11/20/2023 3.122    • Cholesterol 11/20/2023 222 (H)    • Triglycerides 11/20/2023 51    • HDL, Direct 11/20/2023 107    • LDL Calculated 11/20/2023 105 (H)    • Vit D, 25-Hydroxy 11/20/2023 20.4 (L)    • WBC 11/20/2023 4.73    • RBC 11/20/2023 4.35    • Hemoglobin 11/20/2023 12.9    • Hematocrit 11/20/2023 40.7    • MCV " 11/20/2023 94    • MCH 11/20/2023 29.7    • MCHC 11/20/2023 31.7    • RDW 11/20/2023 13.2    • MPV 11/20/2023 9.6    • Platelets 11/20/2023 296    • nRBC 11/20/2023 0    • Neutrophils Relative 11/20/2023 55    • Immat GRANS % 11/20/2023 0    • Lymphocytes Relative 11/20/2023 32    • Monocytes Relative 11/20/2023 10    • Eosinophils Relative 11/20/2023 2    • Basophils Relative 11/20/2023 1    • Neutrophils Absolute 11/20/2023 2.61    • Immature Grans Absolute 11/20/2023 0.01    • Lymphocytes Absolute 11/20/2023 1.53    • Monocytes Absolute 11/20/2023 0.46    • Eosinophils Absolute 11/20/2023 0.09    • Basophils Absolute 11/20/2023 0.03    • Sodium 11/20/2023 141    • Potassium 11/20/2023 3.9    • Chloride 11/20/2023 106    • CO2 11/20/2023 27    • ANION GAP 11/20/2023 8    • BUN 11/20/2023 15    • Creatinine 11/20/2023 0.72    • Glucose, Fasting 11/20/2023 91    • Calcium 11/20/2023 9.6    • AST 11/20/2023 17    • ALT 11/20/2023 11    • Alkaline Phosphatase 11/20/2023 59    • Total Protein 11/20/2023 7.4    • Albumin 11/20/2023 4.4    • Total Bilirubin 11/20/2023 0.60    • eGFR 11/20/2023 98    • Vitamin B-12 11/20/2023 363    • FSH 11/20/2023 97.1    • LH 11/20/2023 46.9    • Creatinine, Ur 11/20/2023 37.7    • Protein Urine Random 11/20/2023 <4    • Prot/Creat Ratio, Ur 11/20/2023     • Iron Saturation 11/20/2023 23    • TIBC 11/20/2023 391    • Iron 11/20/2023 89    • UIBC 11/20/2023 302    • Ferritin 11/20/2023 40      I have personally reviewed results with the patient.    Patient's renal ultrasound was assessed. Bilateral kidneys are symmetrical and within normal size parameters. The right kidney exhibits cystic formations, but no calculi, hydronephrosis, or fluid accumulations were observed. The largest cyst in the right kidney measures 8 x 10.     Left kidney shows no masses, multiple cysts, absence of hydronephrosis, no calculi, and no fluid collection. Bladder appears normal.     MRI conducted in 10/2021:    Left kidney exhibits a cyst. No comparison made with previous ultrasound regarding the presence or absence of this cyst (any).   Right kidney presents with multiple renal cysts.            Amari Vera MD   [facility]     Transcribed for Amari Vera MD, by Reynaldo Cohen on 01/06/24 at 7:12 PM. Powered by [employer].

## 2024-01-05 NOTE — ASSESSMENT & PLAN NOTE
The patient exhibits persistent diastolic.hypertension, indicative of systemic stress.   Potential causes include stress, pain, dietary factors, high sodium intake, poor nutrition, and alcohol consumption.   I advised to continue her AG1 supplement.   I instructed the dosage of Benicar be given  to 10 mg in the morning and 10 mg in the evening, with a gradual escalation to 15 mg in the AM and 15 mg in the PM. If hypertension persists, a further increase to 20 mg in the morning and 20 mg in the evening may be considered in may.  I  advised to monitor her blood pressure, aiming for a reading below 130/85 mmHg.   She will notify me if her blood pressure remains consistently high.

## 2024-01-17 ENCOUNTER — HOSPITAL ENCOUNTER (OUTPATIENT)
Dept: RADIOLOGY | Facility: HOSPITAL | Age: 49
Discharge: HOME | End: 2024-01-17
Payer: COMMERCIAL

## 2024-01-17 DIAGNOSIS — Z12.31 ENCOUNTER FOR SCREENING MAMMOGRAM FOR BREAST CANCER: ICD-10-CM

## 2024-01-17 PROCEDURE — 77063 BREAST TOMOSYNTHESIS BI: CPT

## 2024-01-17 PROCEDURE — 77067 SCR MAMMO BI INCL CAD: CPT

## 2024-01-19 ENCOUNTER — TELEPHONE (OUTPATIENT)
Dept: FAMILY MEDICINE CLINIC | Facility: CLINIC | Age: 49
End: 2024-01-19

## 2024-02-16 ENCOUNTER — OFFICE VISIT (OUTPATIENT)
Dept: OBGYN CLINIC | Facility: CLINIC | Age: 49
End: 2024-02-16
Payer: COMMERCIAL

## 2024-02-16 VITALS
BODY MASS INDEX: 27.31 KG/M2 | DIASTOLIC BLOOD PRESSURE: 88 MMHG | SYSTOLIC BLOOD PRESSURE: 126 MMHG | HEIGHT: 67 IN | WEIGHT: 174 LBS

## 2024-02-16 DIAGNOSIS — N81.6 POSTERIOR VAGINAL WALL PROLAPSE: ICD-10-CM

## 2024-02-16 DIAGNOSIS — N81.10 PROLAPSE OF ANTERIOR VAGINAL WALL: Primary | ICD-10-CM

## 2024-02-16 PROCEDURE — 99213 OFFICE O/P EST LOW 20 MIN: CPT | Performed by: OBSTETRICS & GYNECOLOGY

## 2024-02-19 NOTE — PROGRESS NOTES
Assessment/Plan:   Diagnoses and all orders for this visit:    Prolapse of anterior vaginal wall  -     Ambulatory Referral to Physical Therapy; Future    Posterior vaginal wall prolapse  -     Ambulatory Referral to Physical Therapy; Future    Discussed starting with pelvic floor physical therapy and also discussed consideration for Urogyn consultation for prolapse symptoms. Office contact information for Urogyn consultation provided to patient.  At this time patient would like to start with physcial therapy before considering surgery. Recommended continued efforts to avoid constipation as it will continue to exacerbate her symptoms.     I have spent a total time of 20 minutes on 02/16/24 in caring for this patient including Prognosis, Risks and benefits of tx options, Patient and family education, Risk factor reductions, Impressions, Counseling / Coordination of care, Reviewing / ordering tests, medicine, procedures  , and Obtaining or reviewing history  .          Subjective:    Patient ID: Sudha Everett is a 48 y.o. female.  Chief Complaint   Patient presents with    Gynecology Problem     Sudha Everett is here because she is having a lot of vaginal pressure, almost like something is coming out.  Pt would like you to take a look.       Sudha is a 47yo visit.  She reports having increased pelvic pressure feeling a bulge at her vaginal opening especially when standing.  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  She reports improvement in her pelvic pressure and reduction in the vaginal bulge with lying down. She denies any abnormal bleeding and denies any significant pain.  She has noted to that constipation will cause an increase in her pelvic pressure and the size of a bulge and she is trying to reduce her incidences of constipation in order to help reduce any exacerbation.        The following portions of the  "patient's history were reviewed and updated as appropriate: allergies, current medications, past family history, past medical history, past social history, past surgical history, and problem list.    Review of Systems   Constitutional:  Negative for chills, diaphoresis and fever.   Respiratory:  Negative for shortness of breath.    Cardiovascular:  Negative for chest pain.   Gastrointestinal:  Positive for constipation. Negative for diarrhea, nausea, rectal pain and vomiting.   Genitourinary:  Negative for difficulty urinating, dyspareunia, pelvic pain, vaginal bleeding, vaginal discharge and vaginal pain.         Objective:  /88 (BP Location: Right arm, Patient Position: Sitting, Cuff Size: Standard)   Ht 5' 7\" (1.702 m)   Wt 78.9 kg (174 lb)   BMI 27.25 kg/m²   Physical Exam  Constitutional:       General: She is not in acute distress.     Appearance: Normal appearance. She is not diaphoretic.   Genitourinary:      Vulva normal.      Genitourinary Comments: Anterior vaginal wall prolapse visualized with valsalva. Cervix descends to 2cm behind the hymenal ring with valsalva. The anterior vaginal wall descends to the hymenal ring with valsalva. The posterior vaginal wall does descend though not as much as the anterior wall with valsalva.       Right Labia: No rash, tenderness or lesions.     Left Labia: No tenderness, lesions or rash.     No vaginal discharge.      Anterior and posterior vaginal prolapse present.     Cervix is parous.      No cervical motion tenderness, lesion or polyp.      Uterus is not enlarged, tender or prolapsed.      Uterus is midaxial.   POP-Q measurements were:      Aa: 0, Ba: 0, C: -3     gH: pB: TVL:      Ap: -2, Bp: -2, D:  HENT:      Head: Normocephalic and atraumatic.   Pulmonary:      Effort: Pulmonary effort is normal. No respiratory distress.   Musculoskeletal:      Right lower leg: No edema.      Left lower leg: No edema.   Neurological:      Mental Status: She is alert " and oriented to person, place, and time.   Skin:     General: Skin is warm and dry.      Coloration: Skin is not pale.   Psychiatric:         Mood and Affect: Mood normal.         Behavior: Behavior normal.         Thought Content: Thought content normal.         Judgment: Judgment normal.   Vitals and nursing note reviewed.

## 2024-03-05 ENCOUNTER — OFFICE VISIT (OUTPATIENT)
Dept: NEPHROLOGY | Facility: CLINIC | Age: 49
End: 2024-03-05
Payer: COMMERCIAL

## 2024-03-05 VITALS — BODY MASS INDEX: 26.84 KG/M2 | WEIGHT: 171 LBS | HEIGHT: 67 IN

## 2024-03-05 DIAGNOSIS — Q61.3 POLYCYSTIC KIDNEY DISEASE: Primary | ICD-10-CM

## 2024-03-05 DIAGNOSIS — I10 HYPERTENSION, UNSPECIFIED TYPE: ICD-10-CM

## 2024-03-05 PROCEDURE — 99214 OFFICE O/P EST MOD 30 MIN: CPT | Performed by: INTERNAL MEDICINE

## 2024-03-05 NOTE — PROGRESS NOTES
NEPHROLOGY PROGRESS NOTE    Sudha Everett 48 y.o. female MRN: [id number]  Unit/Bed#:  Encounter: [id number]  Reason for Consult: Polycystic kidney disease and hypertension    The patient is here for follow-up I have not seen her in a couple years due to some scheduling mishap but she has been doing well she did state that her blood pressure at times has been a little high and she has had to increase her olmesartan to twice a day.  Other than that she had a nice trip to [place] last year and she has no other complaints.      ASSESSMENT/PLAN:  1.  Polycystic kidney disease    The patient has history of ADPKD.  She is really doing great her creatinine remains normal and her last creatinine was 0.7 with normal electrolytes.  Also when last checked she has no proteinuria.  At this point she is well-preserved kidney function is normal has needed started to decline with respect to her lab work.  She did have a CAT scan through another physician and there was comments about some complex cysts.  I am going to ask a urologist to see if this is something that they feel they need to see her about as there was no Bosniak classification was given.    For now we will continue with yearly follow-up  Blood pressure control  No other changes and I told her to call me if there is any problems or concerns before next visit.    2.  Hypertension    The patient has hypertension she has been on low-dose of olmesartan.  I did explain to her that most patients with ADPKD do develop hypertension and this class of medication is generally very effective.  Her blood pressure is borderline and at times elevated I told her if that is the case she should try and take 10 mg at 1 time as the amount of milligram she takes hopefully will exert the effect and this is supposed to be a once a day medication.  I did explain to her that 10 mg is even a low-dose if the blood pressure is up and not controlled to call me and we will raise the dose and  "monitor.    I have spent a total time of 32 minutes on 03/05/24 in caring for this patient including Diagnostic results, Impressions, Reviewing / ordering tests, medicine, procedures  , and Obtaining or reviewing history  .         SUBJECTIVE:  Review of Systems   Constitutional: Negative for chills, diaphoresis and fever.   HENT: Negative.     Eyes: Negative.    Cardiovascular:  Negative for chest pain, dyspnea on exertion, leg swelling and orthopnea.   Respiratory: Negative.  Negative for cough, shortness of breath, sputum production and wheezing.    Gastrointestinal:  Negative for abdominal pain, diarrhea, nausea and vomiting.   Genitourinary: Negative.    Neurological:  Negative for dizziness, focal weakness, headaches and weakness.   Psychiatric/Behavioral:  Negative for altered mental status, depression, hallucinations and hypervigilance.        OBJECTIVE:  Current Weight: Weight - Scale: 77.6 kg (171 lb)  Vitals:@TMAX(24)@Current:     Height 5' 7\" (1.702 m), weight 77.6 kg (171 lb). , Body mass index is 26.78 kg/m².    [unfilled]    Physical Exam: Ht 5' 7\" (1.702 m)   Wt 77.6 kg (171 lb)   BMI 26.78 kg/m²   Physical Exam  Constitutional:       General: She is not in acute distress.     Appearance: She is not ill-appearing or toxic-appearing.   HENT:      Head: Normocephalic and atraumatic.      Nose: Nose normal.      Mouth/Throat:      Mouth: Mucous membranes are moist.   Eyes:      General: No scleral icterus.     Extraocular Movements: Extraocular movements intact.   Cardiovascular:      Rate and Rhythm: Normal rate and regular rhythm.      Heart sounds:      No friction rub. No gallop.      Comments: No edema.  Pulmonary:      Effort: Pulmonary effort is normal. No respiratory distress.      Breath sounds: No wheezing, rhonchi or rales.   Abdominal:      General: Bowel sounds are normal. There is no distension.      Palpations: Abdomen is soft.      Tenderness: There is no abdominal tenderness. There is " "no rebound.   Musculoskeletal:      Cervical back: Normal range of motion and neck supple.   Neurological:      General: No focal deficit present.      Mental Status: She is alert and oriented to person, place, and time. Mental status is at baseline.   Psychiatric:         Mood and Affect: Mood normal.         Behavior: Behavior normal.         Thought Content: Thought content normal.         Medications:    Current Outpatient Medications:     multivitamin (THERAGRAN) TABS, Take 1 tablet by mouth as needed When patient remembers to take them, Disp: , Rfl:     olmesartan (BENICAR) 5 mg tablet, Take 10 mg by mouth daily, Disp: , Rfl:     olmesartan (BENICAR) 5 mg tablet, TAKE 2 TABLETS BY MOUTH EVERY DAY, Disp: 180 tablet, Rfl: 1    Laboratory Results:  Lab Results   Component Value Date    WBC 4.73 11/20/2023    HGB 12.9 11/20/2023    HCT 40.7 11/20/2023    MCV 94 11/20/2023     11/20/2023     Lab Results   Component Value Date    SODIUM 141 11/20/2023    K 3.9 11/20/2023     11/20/2023    CO2 27 11/20/2023    BUN 15 11/20/2023    CREATININE 0.72 11/20/2023    GLUC 93 11/08/2021    CALCIUM 9.6 11/20/2023     Lab Results   Component Value Date    CALCIUM 9.6 11/20/2023     No results found for: \"LABPROT\"      "

## 2024-03-05 NOTE — LETTER
March 5, 2024     Amari Vera MD  [address] [facility]  [address]  [place] [address]    Patient: Sudha Everett   YOB: 1975   Date of Visit: 3/5/2024       Dear Dr. Vera:    Thank you for referring Sudha Everett to me for evaluation. Below are my notes for this consultation.    If you have questions, please do not hesitate to call me. I look forward to following your patient along with you.         Sincerely,        Masoud Medel MD        CC: No Recipients    Masoud Medel MD  3/5/2024  4:50 PM  Sign when Signing Visit  NEPHROLOGY PROGRESS NOTE    Sudha Everett 48 y.o. female MRN: [id number]  Unit/Bed#:  Encounter: [id number]  Reason for Consult: Polycystic kidney disease and hypertension    The patient is here for follow-up I have not seen her in a couple years due to some scheduling mishap but she has been doing well she did state that her blood pressure at times has been a little high and she has had to increase her olmesartan to twice a day.  Other than that she had a nice trip to [place] last year and she has no other complaints.      ASSESSMENT/PLAN:  1.  Polycystic kidney disease    The patient has history of ADPKD.  She is really doing great her creatinine remains normal and her last creatinine was 0.7 with normal electrolytes.  Also when last checked she has no proteinuria.  At this point she is well-preserved kidney function is normal has needed started to decline with respect to her lab work.  She did have a CAT scan through another physician and there was comments about some complex cysts.  I am going to ask a urologist to see if this is something that they feel they need to see her about as there was no Bosniak classification was given.    For now we will continue with yearly follow-up  Blood pressure control  No other changes and I told her to call me if there is any problems or concerns before next visit.    2.  Hypertension    The patient has hypertension she has  "been on low-dose of olmesartan.  I did explain to her that most patients with ADPKD do develop hypertension and this class of medication is generally very effective.  Her blood pressure is borderline and at times elevated I told her if that is the case she should try and take 10 mg at 1 time as the amount of milligram she takes hopefully will exert the effect and this is supposed to be a once a day medication.  I did explain to her that 10 mg is even a low-dose if the blood pressure is up and not controlled to call me and we will raise the dose and monitor.    I have spent a total time of 32 minutes on 03/05/24 in caring for this patient including Diagnostic results, Impressions, Reviewing / ordering tests, medicine, procedures  , and Obtaining or reviewing history  .         SUBJECTIVE:  Review of Systems   Constitutional: Negative for chills, diaphoresis and fever.   HENT: Negative.     Eyes: Negative.    Cardiovascular:  Negative for chest pain, dyspnea on exertion, leg swelling and orthopnea.   Respiratory: Negative.  Negative for cough, shortness of breath, sputum production and wheezing.    Gastrointestinal:  Negative for abdominal pain, diarrhea, nausea and vomiting.   Genitourinary: Negative.    Neurological:  Negative for dizziness, focal weakness, headaches and weakness.   Psychiatric/Behavioral:  Negative for altered mental status, depression, hallucinations and hypervigilance.        OBJECTIVE:  Current Weight: Weight - Scale: 77.6 kg (171 lb)  Vitals:@TMAX(24)@Current:     Height 5' 7\" (1.702 m), weight 77.6 kg (171 lb). , Body mass index is 26.78 kg/m².    [unfilled]    Physical Exam: Ht 5' 7\" (1.702 m)   Wt 77.6 kg (171 lb)   BMI 26.78 kg/m²   Physical Exam  Constitutional:       General: She is not in acute distress.     Appearance: She is not ill-appearing or toxic-appearing.   HENT:      Head: Normocephalic and atraumatic.      Nose: Nose normal.      Mouth/Throat:      Mouth: Mucous membranes are " "moist.   Eyes:      General: No scleral icterus.     Extraocular Movements: Extraocular movements intact.   Cardiovascular:      Rate and Rhythm: Normal rate and regular rhythm.      Heart sounds:      No friction rub. No gallop.      Comments: No edema.  Pulmonary:      Effort: Pulmonary effort is normal. No respiratory distress.      Breath sounds: No wheezing, rhonchi or rales.   Abdominal:      General: Bowel sounds are normal. There is no distension.      Palpations: Abdomen is soft.      Tenderness: There is no abdominal tenderness. There is no rebound.   Musculoskeletal:      Cervical back: Normal range of motion and neck supple.   Neurological:      General: No focal deficit present.      Mental Status: She is alert and oriented to person, place, and time. Mental status is at baseline.   Psychiatric:         Mood and Affect: Mood normal.         Behavior: Behavior normal.         Thought Content: Thought content normal.         Medications:    Current Outpatient Medications:   •  multivitamin (THERAGRAN) TABS, Take 1 tablet by mouth as needed When patient remembers to take them, Disp: , Rfl:   •  olmesartan (BENICAR) 5 mg tablet, Take 10 mg by mouth daily, Disp: , Rfl:   •  olmesartan (BENICAR) 5 mg tablet, TAKE 2 TABLETS BY MOUTH EVERY DAY, Disp: 180 tablet, Rfl: 1    Laboratory Results:  Lab Results   Component Value Date    WBC 4.73 11/20/2023    HGB 12.9 11/20/2023    HCT 40.7 11/20/2023    MCV 94 11/20/2023     11/20/2023     Lab Results   Component Value Date    SODIUM 141 11/20/2023    K 3.9 11/20/2023     11/20/2023    CO2 27 11/20/2023    BUN 15 11/20/2023    CREATININE 0.72 11/20/2023    GLUC 93 11/08/2021    CALCIUM 9.6 11/20/2023     Lab Results   Component Value Date    CALCIUM 9.6 11/20/2023     No results found for: \"LABPROT\"      "

## 2024-03-05 NOTE — PATIENT INSTRUCTIONS
You are here for follow-up sorry about the confusion with last year's visit but fortunately your kidney function remains normal with a creatinine of 0.7 no protein in the urine so honestly on paper the kidneys are normal functioning.  You did point out that you had a CAT scan I would question a urologist to see if they feel you need to be seen regarding some of the cyst comments as they said somewhat complex.  They did not see any masses and I will let you know.    If you notice your blood pressure is still above the 140s at times you may want to consider doing 10 mg at 1 time.  I did tell you that a starting dose could be 20 mg so it still low so if it is not under control give me a call we can raise the dose.    Otherwise labs and follow-up as scheduled.

## 2024-04-11 ENCOUNTER — ANNUAL EXAM (OUTPATIENT)
Dept: OBGYN CLINIC | Facility: CLINIC | Age: 49
End: 2024-04-11
Payer: COMMERCIAL

## 2024-04-11 VITALS
DIASTOLIC BLOOD PRESSURE: 80 MMHG | WEIGHT: 170 LBS | SYSTOLIC BLOOD PRESSURE: 130 MMHG | HEIGHT: 67 IN | BODY MASS INDEX: 26.68 KG/M2

## 2024-04-11 DIAGNOSIS — Z01.419 WELL WOMAN EXAM WITH ROUTINE GYNECOLOGICAL EXAM: Primary | ICD-10-CM

## 2024-04-11 DIAGNOSIS — Z12.31 ENCOUNTER FOR SCREENING MAMMOGRAM FOR MALIGNANT NEOPLASM OF BREAST: ICD-10-CM

## 2024-04-11 PROCEDURE — S0612 ANNUAL GYNECOLOGICAL EXAMINA: HCPCS | Performed by: OBSTETRICS & GYNECOLOGY

## 2024-04-11 NOTE — PROGRESS NOTES
ASSESSMENT & PLAN:   Diagnoses and all orders for this visit:    Well woman exam with routine gynecological exam    Encounter for screening mammogram for malignant neoplasm of breast  -     Mammo screening bilateral w 3d & cad; Future          The following were reviewed in today's visit: ASCCP guidelines, Gardisil vaccination, STD testing breast self exam, mammography screening ordered, menopause, exercise, healthy diet, and colon cancer screening.    Patient to return to office in yearly for annual exam.     All questions have been answered to her satisfaction.        CC:  Annual Gynecologic Examination  Chief Complaint   Patient presents with    Gynecologic Exam       Annual exam, pap not indicated. Pt is well, states there are no concerns at this time. Pt has requested pap testing.   Last pap 2022 neg pap/ neg hpv   Last mammo 2024 neg-repeat 1 yr   Cologuard 12/15/2022-Negative        HPI: Sudha Everett is a 48 y.o.  who presents for annual gynecologic examination.  She has the following concerns:  none.   Prolapse symptoms have improved with pelvic floor PT. Has not had menses since May.     Health Maintenance:    Exercise: occasionally  Breast exams/breast awareness: yes  Last mammogram: 2024 - [facility] 2  Colorectal cancer screening: Cologuard  negative, repeat due     Past Medical History:   Diagnosis Date    Hypertension     Polycystic kidney disease        Past Surgical History:   Procedure Laterality Date     SECTION      MAMMO (HISTORICAL)         Past OB/Gyn History:   Patient's last menstrual period was 2023 (approximate).    Menopausal status: postmenopausal  Menopausal symptoms: hot flashes but have reduced in frequency, doesn't remember when her last one occurred. Some vaginal dryness with intercourse.     Last Pap:  : no abnormalities  History of abnormal Pap smear: no    Patient is currently sexually active.   STD testing: no  Current  contraception: post menopausal status      Family History  Family History   Problem Relation Age of Onset    Hypertension Mother     Other Mother         kidney stone    Polycystic kidney disease Mother     Hypertension Father     Kidney disease Maternal Grandmother     Other Maternal Grandmother         Kidney stone    Brain cancer Paternal Grandfather         unknown age    No Known Problems Sister     No Known Problems Daughter     No Known Problems Son     No Known Problems Brother     No Known Problems Brother        Family history of uterine or ovarian cancer: no  Family history of breast cancer: no  Family history of colon cancer: no    Social History:  Social History     Socioeconomic History    Marital status: /Civil Union     Spouse name: Not on file    Number of children: Not on file    Years of education: Not on file    Highest education level: 12th grade   Occupational History    Occupation: Retail    Tobacco Use    Smoking status: Never    Smokeless tobacco: Never   Vaping Use    Vaping status: Never Used   Substance and Sexual Activity    Alcohol use: Yes     Comment: occasionally    Drug use: No    Sexual activity: Yes     Partners: Male     Birth control/protection: Condom Male   Other Topics Concern    Not on file   Social History Narrative    ** Merged History Encounter **         Do you currently or have you served in the [employer]: No  Were you activated, into active duty, as a member of the [employer] or as a Reservist: No  Occupation:  retail store  Education: 12  Marital status:   Sexual orientatio    n: Heterosexual  Exercise level: Moderate  Diet: Regular  General stress level: Medium  Alcohol intake: Moderate  Caffeine intake: Moderate  Chewing tobacco: none  Illicit drugs: none  Seat belts used routinely: Yes  Sunscreen used routinely: Yes  Smoke     alarm in home: Yes  Advance directive: No  Salt Intake: minimal  Has the Patient had a mammogram to  "screen for breast cancer within 24 months: No     Social Determinants of Health     Financial Resource Strain: Not on file   Food Insecurity: Not on file   Transportation Needs: Not on file   Physical Activity: Not on file   Stress: Not on file   Social Connections: Not on file   Intimate Partner Violence: Not on file   Housing Stability: Not on file     Domestic violence screen: negative    Allergies:  Allergies   Allergen Reactions    Prednisone Hypertension    Prednisone Palpitations       Medications:    Current Outpatient Medications:     multivitamin (THERAGRAN) TABS, Take 1 tablet by mouth as needed When patient remembers to take them, Disp: , Rfl:     olmesartan (BENICAR) 5 mg tablet, TAKE 2 TABLETS BY MOUTH EVERY DAY, Disp: 180 tablet, Rfl: 1    olmesartan (BENICAR) 5 mg tablet, Take 10 mg by mouth daily, Disp: , Rfl:     Review of Systems:  Review of Systems   Constitutional:  Negative for activity change, appetite change and unexpected weight change.   Respiratory:  Negative for cough and shortness of breath.    Cardiovascular:  Negative for chest pain.   Gastrointestinal:  Negative for abdominal pain, constipation, diarrhea, nausea and vomiting.   Genitourinary:  Positive for dyspareunia. Negative for difficulty urinating, frequency, menstrual problem, pelvic pain, urgency, vaginal bleeding, vaginal discharge and vaginal pain.   Musculoskeletal:  Negative for back pain.   Skin: Negative.    Neurological:  Negative for dizziness, weakness, light-headedness and headaches.   Psychiatric/Behavioral: Negative.           Physical Exam:  /80 (BP Location: Left arm, Patient Position: Sitting, Cuff Size: Standard)   Ht 5' 7\" (1.702 m)   Wt 77.1 kg (170 lb)   LMP 05/01/2023 (Approximate)   BMI 26.63 kg/m²    Physical Exam  Constitutional:       General: She is not in acute distress.     Appearance: Normal appearance. She is well-developed and normal weight. She is not diaphoretic.   Genitourinary:      " Vulva and bladder normal.      No lesions in the vagina.      Genitourinary Comments: Perineum normal in appearance, no lacerations, no ulcerations, no lesions visualized.      Right Labia: No rash, tenderness or lesions.     Left Labia: No tenderness, lesions or rash.     No inguinal adenopathy present in the right or left side.     No vaginal discharge, erythema, tenderness or bleeding.      Anterior and apical vaginal prolapse present.     No vaginal atrophy present.       Right Adnexa: not tender, not full and no mass present.     Left Adnexa: not tender, not full and no mass present.     Cervix is parous.      No cervical motion tenderness, discharge, friability, lesion or polyp.      No parametrium nodularity or thickening present.     Uterus is not enlarged or tender.      No uterine mass detected.     Uterus is midaxial.      No urethral prolapse or mass present.      Bladder is not tender.       Pelvic exam was performed with patient in the lithotomy position.   Rectum:      No tenderness or external hemorrhoid.   Breasts:     Breasts are symmetrical.      Right: No swelling, bleeding, mass, skin change or tenderness.      Left: No swelling, bleeding, mass, skin change or tenderness.   HENT:      Head: Normocephalic and atraumatic.   Neck:      Thyroid: No thyromegaly or thyroid tenderness.   Cardiovascular:      Rate and Rhythm: Normal rate and regular rhythm.      Heart sounds: Normal heart sounds. No murmur heard.     No friction rub.   Pulmonary:      Effort: Pulmonary effort is normal. No respiratory distress.      Breath sounds: Normal breath sounds. No wheezing or rales.   Abdominal:      Palpations: Abdomen is soft. There is no mass.      Tenderness: There is no abdominal tenderness. There is no guarding.   Musculoskeletal:         General: No tenderness. Normal range of motion.      Right lower leg: No edema.      Left lower leg: No edema.   Lymphadenopathy:      Lower Body: No right inguinal  adenopathy. No left inguinal adenopathy.   Neurological:      Mental Status: She is alert and oriented to person, place, and time.   Skin:     General: Skin is warm and dry.      Coloration: Skin is not pale.      Findings: No erythema.   Psychiatric:         Mood and Affect: Mood normal.         Behavior: Behavior normal.         Thought Content: Thought content normal.         Judgment: Judgment normal.   Vitals and nursing note reviewed.

## 2024-05-15 DIAGNOSIS — I10 ESSENTIAL HYPERTENSION: ICD-10-CM

## 2024-05-15 RX ORDER — OLMESARTAN MEDOXOMIL 5 MG/1
TABLET ORAL
Qty: 180 TABLET | Refills: 1 | Status: SHIPPED | OUTPATIENT
Start: 2024-05-15

## 2024-06-25 ENCOUNTER — OFFICE VISIT (OUTPATIENT)
Dept: URGENT CARE | Facility: CLINIC | Age: 49
End: 2024-06-25
Payer: COMMERCIAL

## 2024-06-25 VITALS
BODY MASS INDEX: 26.68 KG/M2 | DIASTOLIC BLOOD PRESSURE: 84 MMHG | WEIGHT: 170 LBS | SYSTOLIC BLOOD PRESSURE: 140 MMHG | OXYGEN SATURATION: 99 % | RESPIRATION RATE: 14 BRPM | TEMPERATURE: 97.8 F | HEIGHT: 67 IN | HEART RATE: 78 BPM

## 2024-06-25 DIAGNOSIS — J06.9 VIRAL UPPER RESPIRATORY TRACT INFECTION: Primary | ICD-10-CM

## 2024-06-25 PROCEDURE — S9083 URGENT CARE CENTER GLOBAL: HCPCS | Performed by: NURSE PRACTITIONER

## 2024-06-25 PROCEDURE — G0382 LEV 3 HOSP TYPE B ED VISIT: HCPCS | Performed by: NURSE PRACTITIONER

## 2024-06-25 NOTE — PATIENT INSTRUCTIONS
--Rest, drink plenty of fluids  --Consider vitamin C, zinc, quercetin, and vitamin D to help strengthen your immune system  --For cough, you can take an OTC expectorant such as plain Robitussion or Mucinex (active ingredient guaifenesin).  A spoonful of honey at bedtime may also be helpful, as may a prescription cough medicine.  Also recommended is the use of a cool mist humidifier (with or without Vicks) in the bedroom at night.   --For sore throat, you can take OTC lozenges, use warm gargles (salt water or apple cider vinegar and honey), herbal teas, or an OTC throat spray (Chloraseptic).  --For nasal/sinus congestion, helpful measures include steam, warm compresses, an OTC saline nasal spray or Neti pot, or an OTC decongestant (such as Sudafed).  The decongestant should be avoided, however, if you are under 6 years of age, or have a history of high blood pressure or heart disease.  In addition, an OTC nasal steroid (Flonase, Nasocort) or nasal decongestant (Afrin, Alonso-synephrine) may be taken.  The nasal steroid should be used at bedtime, after the saline nasal spray. The nasal decongestant should not be taken more than 3 days consecutively in order to prevent rebound congestion.   --For nasal drainage, postnasal drip, sneezing and itching, an OTC antihistamine (Allegra, Benadryl, etc) can be taken.   --You can take Tylenol or Motrin/Advil as needed for fever, headache, body aches. Motrin/Advil should be avoided, however, if you have a history of heart disease, bleeding ulcers, or if you take blood thinners.   --You should contact your primary care provider and/or go to the ER if your symptoms are not improved or get worse over the next 7 days.  This includes new onset fever, localized ear pain, sinus pain, as well as worsening cough, chest pain, shortness of breath, or significant weakness/fatigue.

## 2024-06-25 NOTE — PROGRESS NOTES
[facility]        NAME: Sudha Everett is a 48 y.o. female  : 1975    MRN: [id number]  DATE: 2024  TIME: 9:07 AM    Assessment and Plan   Viral upper respiratory tract infection [J06.9]  1. Viral upper respiratory tract infection              Patient Instructions     Patient Instructions   --Rest, drink plenty of fluids  --Consider vitamin C, zinc, quercetin, and vitamin D to help strengthen your immune system  --For cough, you can take an OTC expectorant such as plain Robitussion or Mucinex (active ingredient guaifenesin).  A spoonful of honey at bedtime may also be helpful, as may a prescription cough medicine.  Also recommended is the use of a cool mist humidifier (with or without Vicks) in the bedroom at night.   --For sore throat, you can take OTC lozenges, use warm gargles (salt water or apple cider vinegar and honey), herbal teas, or an OTC throat spray (Chloraseptic).  --For nasal/sinus congestion, helpful measures include steam, warm compresses, an OTC saline nasal spray or Neti pot, or an OTC decongestant (such as Sudafed).  The decongestant should be avoided, however, if you are under 6 years of age, or have a history of high blood pressure or heart disease.  In addition, an OTC nasal steroid (Flonase, Nasocort) or nasal decongestant (Afrin, Alonso-synephrine) may be taken.  The nasal steroid should be used at bedtime, after the saline nasal spray. The nasal decongestant should not be taken more than 3 days consecutively in order to prevent rebound congestion.   --For nasal drainage, postnasal drip, sneezing and itching, an OTC antihistamine (Allegra, Benadryl, etc) can be taken.   --You can take Tylenol or Motrin/Advil as needed for fever, headache, body aches. Motrin/Advil should be avoided, however, if you have a history of heart disease, bleeding ulcers, or if you take blood thinners.   --You should contact your primary care provider and/or go to the ER if your symptoms  are not improved or get worse over the next 7 days.  This includes new onset fever, localized ear pain, sinus pain, as well as worsening cough, chest pain, shortness of breath, or significant weakness/fatigue.          If tests have been performed at [facility], our office will contact you with results if changes need to be made to the care plan discussed with you at the visit.  You can review your full results on [facility].    Chief Complaint     Chief Complaint   Patient presents with    Cold Like Symptoms     Worsening URI s/s x 3-4 days.          History of Present Illness       Here with complaints of nasal congestion, rhinorrhea, mild cough, body aches, sore throat x 2 days.    No fever.   No ear pain.    No abdominal pain, N/V/D.  No dyspnea, wheezing.    Taking Dayquil, Nyquil.            Review of Systems   Review of Systems   Constitutional:  Negative for fever.   HENT:  Positive for rhinorrhea and sore throat. Negative for ear pain.    Respiratory:  Positive for cough.    Gastrointestinal:  Negative for abdominal pain, nausea and vomiting.   Musculoskeletal:  Negative for myalgias.         Current Medications       Current Outpatient Medications:     multivitamin (THERAGRAN) TABS, Take 1 tablet by mouth as needed When patient remembers to take them, Disp: , Rfl:     olmesartan (BENICAR) 5 mg tablet, Take 10 mg by mouth daily, Disp: , Rfl:     olmesartan (BENICAR) 5 mg tablet, TAKE 2 TABLETS BY MOUTH EVERY DAY, Disp: 180 tablet, Rfl: 1    Current Allergies     Allergies as of 06/25/2024 - Reviewed 06/25/2024   Allergen Reaction Noted    Prednisone Hypertension 12/03/2020    Prednisone Palpitations 12/29/2023            The following portions of the patient's history were reviewed and updated as appropriate: allergies, current medications, past family history, past medical history, past social history, past surgical history and problem list.     Past Medical History:   Diagnosis Date    Hypertension   "   Polycystic kidney disease        Past Surgical History:   Procedure Laterality Date     SECTION      MAMMO (HISTORICAL)  2017       Family History   Problem Relation Age of Onset    Hypertension Mother     Other Mother         kidney stone    Polycystic kidney disease Mother     Hypertension Father     Kidney disease Maternal Grandmother     Other Maternal Grandmother         Kidney stone    Brain cancer Paternal Grandfather         unknown age    No Known Problems Sister     No Known Problems Daughter     No Known Problems Son     No Known Problems Brother     No Known Problems Brother          Medications have been verified.        Objective   /84   Pulse 78   Temp 97.8 °F (36.6 °C)   Resp 14   Ht 5' 7\" (1.702 m)   Wt 77.1 kg (170 lb)   SpO2 99%   BMI 26.63 kg/m²   No LMP recorded. Patient is premenopausal.       Physical Exam     Physical Exam  Constitutional:       General: She is not in acute distress.     Appearance: Normal appearance. She is well-developed. She is not ill-appearing, toxic-appearing or diaphoretic.   HENT:      Head: Normocephalic.      Right Ear: Tympanic membrane, ear canal and external ear normal.      Left Ear: Tympanic membrane, ear canal and external ear normal.      Nose: Congestion and rhinorrhea present.      Comments: No sinus tenderness.         Mouth/Throat:      Pharynx: No oropharyngeal exudate or posterior oropharyngeal erythema.   Eyes:      General:         Right eye: No discharge.         Left eye: No discharge.   Cardiovascular:      Rate and Rhythm: Normal rate and regular rhythm.      Heart sounds: Normal heart sounds. No murmur heard.  Pulmonary:      Effort: Pulmonary effort is normal. No respiratory distress.      Breath sounds: Normal breath sounds. No stridor. No wheezing, rhonchi or rales.   Chest:      Chest wall: No tenderness.   Abdominal:      Tenderness: There is no abdominal tenderness.   Musculoskeletal:      Cervical back: Neck supple. "   Lymphadenopathy:      Cervical: No cervical adenopathy.   Skin:     General: Skin is warm and dry.   Neurological:      Mental Status: She is alert and oriented to person, place, and time.      Deep Tendon Reflexes: Reflexes are normal and symmetric.   Psychiatric:         Mood and Affect: Mood normal.

## 2024-06-25 NOTE — LETTER
June 25, 2024     Patient: Sudha Everett   YOB: 1975   Date of Visit: 6/25/2024       To Whom it May Concern:    Sudha Everett was seen in my clinic on 6/25/2024. Please excuse from work 6/24-6/26 due to illness.     If you have any questions or concerns, please don't hesitate to call.         Sincerely,          JUSTO Saldana        CC: No Recipients

## 2024-11-15 ENCOUNTER — OFFICE VISIT (OUTPATIENT)
Dept: FAMILY MEDICINE CLINIC | Facility: CLINIC | Age: 49
End: 2024-11-15
Payer: COMMERCIAL

## 2024-11-15 VITALS
HEART RATE: 69 BPM | SYSTOLIC BLOOD PRESSURE: 140 MMHG | HEIGHT: 67 IN | WEIGHT: 168 LBS | DIASTOLIC BLOOD PRESSURE: 102 MMHG | RESPIRATION RATE: 16 BRPM | OXYGEN SATURATION: 98 % | BODY MASS INDEX: 26.37 KG/M2

## 2024-11-15 DIAGNOSIS — N95.1 PERIMENOPAUSAL: ICD-10-CM

## 2024-11-15 DIAGNOSIS — E55.9 VITAMIN D DEFICIENCY: ICD-10-CM

## 2024-11-15 DIAGNOSIS — E78.2 MIXED HYPERLIPIDEMIA: ICD-10-CM

## 2024-11-15 DIAGNOSIS — Q61.3 POLYCYSTIC KIDNEY DISEASE: ICD-10-CM

## 2024-11-15 DIAGNOSIS — I15.1 HYPERTENSION SECONDARY TO OTHER RENAL DISORDERS: ICD-10-CM

## 2024-11-15 DIAGNOSIS — I10 ESSENTIAL HYPERTENSION: ICD-10-CM

## 2024-11-15 DIAGNOSIS — Z00.00 WELL ADULT EXAM: Primary | ICD-10-CM

## 2024-11-15 PROCEDURE — 99396 PREV VISIT EST AGE 40-64: CPT | Performed by: FAMILY MEDICINE

## 2024-11-15 PROCEDURE — 99214 OFFICE O/P EST MOD 30 MIN: CPT | Performed by: FAMILY MEDICINE

## 2024-11-15 RX ORDER — OLMESARTAN MEDOXOMIL 5 MG/1
10 TABLET ORAL DAILY
Qty: 180 TABLET | Refills: 1 | Status: SHIPPED | OUTPATIENT
Start: 2024-11-15

## 2024-11-15 NOTE — PROGRESS NOTES
Name: Sudha Everett      : 1975      MRN: [id number]  Encounter Provider: Amari Vera MD  Encounter Date: 11/15/2024   Encounter department: [facility]    Assessment & Plan  Well adult exam    Orders:    CBC and differential    Lipid Panel with Direct LDL reflex    Vitamin D 25 hydroxy    Comprehensive metabolic panel    TSH, 3rd generation with Free T4 reflex    UA w Reflex to Microscopic w Reflex to Culture; Future    US right upper quadrant; Future    Mixed hyperlipidemia    Orders:    Lipid Panel with Direct LDL reflex    CT coronary calcium score; Future    Vitamin D deficiency    Orders:    Vitamin D 25 hydroxy    Polycystic kidney disease    Orders:    US right upper quadrant; Future    Hypertension secondary to other renal disorders    Orders:    US right upper quadrant; Future    CT coronary calcium score; Future    Perimenopausal    Orders:    US right upper quadrant; Future    FSH and LH; Future    Essential hypertension    Orders:    olmesartan (BENICAR) 5 mg tablet; Take 2 tablets (10 mg total) by mouth daily       Assessment & Plan  1. Hypertension.  Elevated blood pressure was noted during today's visit, likely due to recent stressors including her father-in-law's hospitalization and the passing of her mother-in-law. She was advised to monitor her blood pressure at home, practice relaxation techniques such as walking and breathing exercises, and recheck her blood pressure after a few hours. If the readings remain high (top #150 and bottom #90), she should take her prescribed medication. A prescription for olmesartan was provided.    2. Abdominal girth.  She expressed concern about fat around her stomach. Hormonal factors were discussed as a potential cause. She was informed that hormone replacement therapy might help mitigate this issue. Additionally, weight loss medications like Ozempic were mentioned, but she expressed reluctance to take such medications.    3.  Menopause.  She reported experiencing hot flashes and tension due to stress. Blood tests for LH and FSH levels were ordered to assess her hormonal status.    4. Health Maintenance.  Blood tests, a liver ultrasound, and a coronary calcium score were ordered. She was advised to schedule a mammogram and a gynecological appointment. A urine test was also ordered.            History of Present Illness     History of Present Illness  The patient is a 49-year-old female who presents for evaluation of multiple medical concerns.    She reports experiencing high blood pressure, which she attributes to recent stressors including the hospitalization of her father-in-law and the passing of her mother-in-law. She has been attempting to manage her blood pressure naturally through exercise and proper breathing techniques, but recent events have made this difficult. She expresses a preference for natural remedies over medication, citing difficulty swallowing pills and a dislike for them. She notes that her blood pressure was stable prior to these events, and she was sleeping well. She mentions feeling tired when taking her medication daily but more energetic when not on it. She reports no complications or headaches, except for one instance yesterday due to stress. She has taken time off work to manage her health and has cancelled a planned company visit.    She is due for her annual blood work and has noticed some weight gain around her stomach, which she is unsure how to address. She reports no gastrointestinal issues such as reflux, constipation, or diarrhea. She also reports no vision problems, although she uses reading glasses. She has regular dental check-ups and reports no chest pain.    She is interested in checking for fatty liver or heart disease, as her father has these conditions. She is also due for a mammogram and needs to schedule an appointment with her gynecologist. She is currently going through menopause and  "experiences occasional hot flashes. Additionally, she reports feeling tense, which she attributes to recent stress.    FAMILY HISTORY  Her father has fatty liver and heart.     Review of Systems   Constitutional:  Negative for fever and unexpected weight change.   HENT:  Negative for nosebleeds and trouble swallowing.    Eyes:  Negative for visual disturbance.   Respiratory:  Negative for chest tightness and shortness of breath.    Cardiovascular:  Negative for chest pain, palpitations and leg swelling.   Gastrointestinal:  Negative for abdominal pain, constipation, diarrhea and nausea.   Endocrine: Negative for cold intolerance.   Genitourinary:  Negative for dysuria and urgency.   Musculoskeletal:  Negative for joint swelling and myalgias.   Skin:  Negative for rash.   Neurological:  Negative for tremors, seizures and syncope.   Hematological:  Does not bruise/bleed easily.   Psychiatric/Behavioral:  Negative for hallucinations and suicidal ideas.      Objective   BP (!) 140/102   Pulse 69   Resp 16   Ht 5' 7\" (1.702 m)   Wt 76.2 kg (168 lb)   SpO2 98%   BMI 26.31 kg/m²     Physical Exam    Physical Exam  Vitals and nursing note reviewed.   Constitutional:       Appearance: She is well-developed.   HENT:      Head: Normocephalic and atraumatic.      Right Ear: External ear normal.      Left Ear: External ear normal.      Nose: Nose normal.   Eyes:      Conjunctiva/sclera: Conjunctivae normal.      Pupils: Pupils are equal, round, and reactive to light.   Cardiovascular:      Rate and Rhythm: Normal rate and regular rhythm.      Heart sounds: Normal heart sounds. No murmur heard.  Pulmonary:      Effort: Pulmonary effort is normal.      Breath sounds: Normal breath sounds. No wheezing.   Abdominal:      General: Bowel sounds are normal.      Palpations: Abdomen is soft.   Musculoskeletal:         General: No tenderness. Normal range of motion.      Cervical back: Normal range of motion and neck supple. "   Lymphadenopathy:      Cervical: No cervical adenopathy.   Skin:     General: Skin is warm and dry.      Capillary Refill: Capillary refill takes less than 2 seconds.   Neurological:      Mental Status: She is alert and oriented to person, place, and time.   Psychiatric:         Behavior: Behavior normal.         Thought Content: Thought content normal.         Judgment: Judgment normal.

## 2024-11-26 ENCOUNTER — APPOINTMENT (OUTPATIENT)
Dept: LAB | Facility: CLINIC | Age: 49
End: 2024-11-26
Payer: COMMERCIAL

## 2024-11-26 DIAGNOSIS — N95.1 PERIMENOPAUSAL: ICD-10-CM

## 2024-11-26 DIAGNOSIS — Z00.00 WELL ADULT EXAM: ICD-10-CM

## 2024-11-26 LAB
25(OH)D3 SERPL-MCNC: 20.7 NG/ML (ref 30–100)
ALBUMIN SERPL BCG-MCNC: 4.3 G/DL (ref 3.5–5)
ALP SERPL-CCNC: 58 U/L (ref 34–104)
ALT SERPL W P-5'-P-CCNC: 11 U/L (ref 7–52)
ANION GAP SERPL CALCULATED.3IONS-SCNC: 6 MMOL/L (ref 4–13)
AST SERPL W P-5'-P-CCNC: 14 U/L (ref 13–39)
BACTERIA UR QL AUTO: ABNORMAL /HPF
BASOPHILS # BLD AUTO: 0.02 THOUSANDS/ΜL (ref 0–0.1)
BASOPHILS NFR BLD AUTO: 1 % (ref 0–1)
BILIRUB SERPL-MCNC: 0.39 MG/DL (ref 0.2–1)
BILIRUB UR QL STRIP: NEGATIVE
BUN SERPL-MCNC: 17 MG/DL (ref 5–25)
CALCIUM SERPL-MCNC: 9.4 MG/DL (ref 8.4–10.2)
CHLORIDE SERPL-SCNC: 108 MMOL/L (ref 96–108)
CHOLEST SERPL-MCNC: 196 MG/DL (ref ?–200)
CLARITY UR: CLEAR
CO2 SERPL-SCNC: 27 MMOL/L (ref 21–32)
COLOR UR: COLORLESS
CREAT SERPL-MCNC: 0.69 MG/DL (ref 0.6–1.3)
EOSINOPHIL # BLD AUTO: 0.1 THOUSAND/ΜL (ref 0–0.61)
EOSINOPHIL NFR BLD AUTO: 2 % (ref 0–6)
ERYTHROCYTE [DISTWIDTH] IN BLOOD BY AUTOMATED COUNT: 13 % (ref 11.6–15.1)
FSH SERPL-ACNC: 115.2 MIU/ML
GFR SERPL CREATININE-BSD FRML MDRD: 101 ML/MIN/1.73SQ M
GLUCOSE P FAST SERPL-MCNC: 101 MG/DL (ref 65–99)
GLUCOSE UR STRIP-MCNC: NEGATIVE MG/DL
HCT VFR BLD AUTO: 40.7 % (ref 34.8–46.1)
HDLC SERPL-MCNC: 96 MG/DL
HGB BLD-MCNC: 13.1 G/DL (ref 11.5–15.4)
HGB UR QL STRIP.AUTO: NEGATIVE
IMM GRANULOCYTES # BLD AUTO: 0.01 THOUSAND/UL (ref 0–0.2)
IMM GRANULOCYTES NFR BLD AUTO: 0 % (ref 0–2)
KETONES UR STRIP-MCNC: NEGATIVE MG/DL
LDLC SERPL CALC-MCNC: 89 MG/DL (ref 0–100)
LEUKOCYTE ESTERASE UR QL STRIP: ABNORMAL
LH SERPL-ACNC: 45.5 MIU/ML
LYMPHOCYTES # BLD AUTO: 1.3 THOUSANDS/ΜL (ref 0.6–4.47)
LYMPHOCYTES NFR BLD AUTO: 31 % (ref 14–44)
MCH RBC QN AUTO: 30 PG (ref 26.8–34.3)
MCHC RBC AUTO-ENTMCNC: 32.2 G/DL (ref 31.4–37.4)
MCV RBC AUTO: 93 FL (ref 82–98)
MONOCYTES # BLD AUTO: 0.41 THOUSAND/ΜL (ref 0.17–1.22)
MONOCYTES NFR BLD AUTO: 10 % (ref 4–12)
NEUTROPHILS # BLD AUTO: 2.32 THOUSANDS/ΜL (ref 1.85–7.62)
NEUTS SEG NFR BLD AUTO: 56 % (ref 43–75)
NITRITE UR QL STRIP: NEGATIVE
NON-SQ EPI CELLS URNS QL MICRO: ABNORMAL /HPF
NRBC BLD AUTO-RTO: 0 /100 WBCS
PH UR STRIP.AUTO: 6.5 [PH]
PLATELET # BLD AUTO: 282 THOUSANDS/UL (ref 149–390)
PMV BLD AUTO: 9.4 FL (ref 8.9–12.7)
POTASSIUM SERPL-SCNC: 4.9 MMOL/L (ref 3.5–5.3)
PROT SERPL-MCNC: 7.4 G/DL (ref 6.4–8.4)
PROT UR STRIP-MCNC: NEGATIVE MG/DL
RBC # BLD AUTO: 4.36 MILLION/UL (ref 3.81–5.12)
RBC #/AREA URNS AUTO: ABNORMAL /HPF
SODIUM SERPL-SCNC: 141 MMOL/L (ref 135–147)
SP GR UR STRIP.AUTO: 1.01 (ref 1–1.03)
TRIGL SERPL-MCNC: 56 MG/DL (ref ?–150)
TSH SERPL DL<=0.05 MIU/L-ACNC: 2.43 UIU/ML (ref 0.45–4.5)
UROBILINOGEN UR STRIP-ACNC: <2 MG/DL
WBC # BLD AUTO: 4.16 THOUSAND/UL (ref 4.31–10.16)
WBC #/AREA URNS AUTO: ABNORMAL /HPF

## 2024-11-26 PROCEDURE — 36415 COLL VENOUS BLD VENIPUNCTURE: CPT | Performed by: FAMILY MEDICINE

## 2024-11-26 PROCEDURE — 82306 VITAMIN D 25 HYDROXY: CPT | Performed by: FAMILY MEDICINE

## 2024-11-26 PROCEDURE — 85025 COMPLETE CBC W/AUTO DIFF WBC: CPT | Performed by: FAMILY MEDICINE

## 2024-11-26 PROCEDURE — 81001 URINALYSIS AUTO W/SCOPE: CPT

## 2024-11-26 PROCEDURE — 83001 ASSAY OF GONADOTROPIN (FSH): CPT

## 2024-11-26 PROCEDURE — 80061 LIPID PANEL: CPT | Performed by: FAMILY MEDICINE

## 2024-11-26 PROCEDURE — 80053 COMPREHEN METABOLIC PANEL: CPT | Performed by: FAMILY MEDICINE

## 2024-11-26 PROCEDURE — 84443 ASSAY THYROID STIM HORMONE: CPT | Performed by: FAMILY MEDICINE

## 2024-11-26 PROCEDURE — 83002 ASSAY OF GONADOTROPIN (LH): CPT

## 2024-11-29 ENCOUNTER — RESULTS FOLLOW-UP (OUTPATIENT)
Dept: FAMILY MEDICINE CLINIC | Facility: CLINIC | Age: 49
End: 2024-11-29

## 2025-03-31 ENCOUNTER — TELEPHONE (OUTPATIENT)
Age: 50
End: 2025-03-31

## 2025-03-31 NOTE — TELEPHONE ENCOUNTER
"Patient calling in regards to scheduling follow up appointment with Dr Medel    One appointment was available 5/1 and nothing until 6/12.    Patient declined and kept asking for dates not available. Patient declined scheduling with anyone but Dr Medel.    Patient states \"I need to talk to the doctor he told me before because you guys give me a hard time with scheduling that I am to call him directly and he will put me on the schedule when works for me\"    Advised patient I was only able to offer her what was available and that I could not connect her right with the doctor but would escalate her concern.     Patient declined scheduling. Advised patient I would escalate her scheduling concern.     Please advise.    Manager Melissa has been made aware.  "

## 2025-04-09 ENCOUNTER — DOCUMENTATION (OUTPATIENT)
Dept: ADMINISTRATIVE | Facility: OTHER | Age: 50
End: 2025-04-09

## 2025-04-09 ENCOUNTER — OFFICE VISIT (OUTPATIENT)
Dept: URGENT CARE | Facility: CLINIC | Age: 50
End: 2025-04-09
Payer: COMMERCIAL

## 2025-04-09 VITALS
DIASTOLIC BLOOD PRESSURE: 110 MMHG | HEART RATE: 73 BPM | RESPIRATION RATE: 16 BRPM | TEMPERATURE: 98.8 F | SYSTOLIC BLOOD PRESSURE: 190 MMHG | OXYGEN SATURATION: 98 %

## 2025-04-09 DIAGNOSIS — Z11.52 ENCOUNTER FOR SCREENING FOR COVID-19: ICD-10-CM

## 2025-04-09 DIAGNOSIS — J06.9 VIRAL UPPER RESPIRATORY TRACT INFECTION: Primary | ICD-10-CM

## 2025-04-09 PROCEDURE — S9083 URGENT CARE CENTER GLOBAL: HCPCS | Performed by: NURSE PRACTITIONER

## 2025-04-09 PROCEDURE — G0382 LEV 3 HOSP TYPE B ED VISIT: HCPCS | Performed by: NURSE PRACTITIONER

## 2025-04-09 PROCEDURE — 87636 SARSCOV2 & INF A&B AMP PRB: CPT | Performed by: NURSE PRACTITIONER

## 2025-04-09 RX ORDER — CODEINE PHOSPHATE AND GUAIFENESIN 10; 100 MG/5ML; MG/5ML
5 SOLUTION ORAL 3 TIMES DAILY PRN
Qty: 180 ML | Refills: 0 | Status: SHIPPED | OUTPATIENT
Start: 2025-04-09

## 2025-04-09 NOTE — PATIENT INSTRUCTIONS
--Rest, drink plenty of fluids  --COVID and flu testing sent. Will call with results. Average turn around time is 24-48 hours.   --Consider vitamin C, zinc, quercetin, and vitamin D to help strengthen your immune system. Consider also black elderberry (Sambucol) which can be effective for flu symptoms.   --For cough, you can take an OTC expectorant such as plain Robitussion or Mucinex (active ingredient guaifenesin).  A spoonful of honey at bedtime may also be helpful, as may a prescription cough medicine.  Also recommended is the use of a cool mist humidifier (with or without Vicks) in the bedroom at night.   --For sore throat, you can take OTC lozenges, use warm gargles (salt water or apple cider vinegar and honey), herbal teas, or an OTC throat spray (Chloraseptic).  --For nasal/sinus congestion, helpful measures include steam, warm compresses, an OTC saline nasal spray or Neti pot, or an OTC decongestant (such as Sudafed).  The decongestant should be avoided, however, if you are under 6 years of age, or have a history of high blood pressure or heart disease.  In addition, an OTC nasal steroid (Flonase, Nasocort) or nasal decongestant (Afrin, Alonso-synephrine) may be taken.  The nasal steroid should be used at bedtime, after the saline nasal spray. The nasal decongestant should not be taken more than 3 days consecutively in order to prevent rebound congestion.   --For nasal drainage, postnasal drip, sneezing and itching, an OTC antihistamine (Allegra, Benadryl, etc) can be taken.   --You can take Tylenol or Motrin/Advil as needed for fever, headache, body aches. Motrin/Advil should be avoided, however, if you have a history of heart disease, bleeding ulcers, or if you take blood thinners.   --You should contact your primary care provider and/or go to the ER if your symptoms are not improved or get worse over the next 7 days.  This includes new onset fever, localized ear pain, sinus pain, as well as worsening cough,  chest pain, shortness of breath, or significant weakness/fatigue.         **Follow-up with PCP in the next couple days regarding your elevated blood pressure.  Go to ER for persistent elevation < 190/110, particularly if accompanied by headache, dizziness, vision changes, chest pain, other immediate concerns**

## 2025-04-09 NOTE — PROGRESS NOTES
Blood pressure elevated  Appointment department: [facility]  Appointment provider: JUSTO Saldana  Blood pressure   04/09/25 1116 (!) 190/110   04/09/25 1027 (!) 221/126

## 2025-04-09 NOTE — LETTER
April 9, 2025     Patient: Sudha Everett   YOB: 1975   Date of Visit: 4/9/2025       To Whom it May Concern:    Sudha Everett was seen in my clinic on 4/9/2025. Please excuse from work the week of 4/7 due to illness.      If you have any questions or concerns, please don't hesitate to call.         Sincerely,          JUSTO Saldana        CC: No Recipients

## 2025-04-09 NOTE — PROGRESS NOTES
[facility]        NAME: Sudha Everett is a 49 y.o. female  : 1975    MRN: [id number]  DATE: 2025  TIME: 11:21 AM    Assessment and Plan   Viral upper respiratory tract infection [J06.9]  1. Viral upper respiratory tract infection  guaiFENesin-codeine (Guaiatussin AC) 100-10 mg/5 mL oral solution      2. Encounter for screening for COVID-19  Covid19 and INFLUENZA A/B PCR        --Prefers PCR testing over presumptive treatment for flu.      Patient Instructions     Patient Instructions   --Rest, drink plenty of fluids  --COVID and flu testing sent. Will call with results. Average turn around time is 24-48 hours.   --Consider vitamin C, zinc, quercetin, and vitamin D to help strengthen your immune system. Consider also black elderberry (Sambucol) which can be effective for flu symptoms.   --For cough, you can take an OTC expectorant such as plain Robitussion or Mucinex (active ingredient guaifenesin).  A spoonful of honey at bedtime may also be helpful, as may a prescription cough medicine.  Also recommended is the use of a cool mist humidifier (with or without Vicks) in the bedroom at night.   --For sore throat, you can take OTC lozenges, use warm gargles (salt water or apple cider vinegar and honey), herbal teas, or an OTC throat spray (Chloraseptic).  --For nasal/sinus congestion, helpful measures include steam, warm compresses, an OTC saline nasal spray or Neti pot, or an OTC decongestant (such as Sudafed).  The decongestant should be avoided, however, if you are under 6 years of age, or have a history of high blood pressure or heart disease.  In addition, an OTC nasal steroid (Flonase, Nasocort) or nasal decongestant (Afrin, Alonso-synephrine) may be taken.  The nasal steroid should be used at bedtime, after the saline nasal spray. The nasal decongestant should not be taken more than 3 days consecutively in order to prevent rebound congestion.   --For nasal drainage, postnasal drip,  sneezing and itching, an OTC antihistamine (Allegra, Benadryl, etc) can be taken.   --You can take Tylenol or Motrin/Advil as needed for fever, headache, body aches. Motrin/Advil should be avoided, however, if you have a history of heart disease, bleeding ulcers, or if you take blood thinners.   --You should contact your primary care provider and/or go to the ER if your symptoms are not improved or get worse over the next 7 days.  This includes new onset fever, localized ear pain, sinus pain, as well as worsening cough, chest pain, shortness of breath, or significant weakness/fatigue.         **Take BP medication as soon as you get home**  **Follow-up with PCP in the next couple days regarding your elevated blood pressure.  Go to ER for persistent elevation < 190/110, particularly if accompanied by headache, dizziness, vision changes, chest pain, other immediate concerns**     If tests have been performed at [facility], our office will contact you with results if changes need to be made to the care plan discussed with you at the visit.  You can review your full results on [facility].    Chief Complaint     Chief Complaint   Patient presents with    Cold Like Symptoms     Pt reports Cough/congestion/runny nose/headache/mild body aches/chill/ increased fatigue which started about 2 days ago. Pt did not take BP meds today.          History of Present Illness       Here with complaints of nasal congestion, rhinorrhea, cough productive of yellow sputum, sore throat, headache, body aches, fatigue since yesterday.  Some chills, no known fever.   No dyspnea.    No GI complaints.   Numerous family members recently sick with similar.    Takin Dayquil and turmeric.      Didn't take BP medication this morning.  States runs 160's/90's typically when she checks at home.   Denies Sudafed use.          Review of Systems   Review of Systems   Constitutional:  Positive for chills. Negative for fever.   HENT:  Positive for  rhinorrhea and sore throat. Negative for ear pain.    Respiratory:  Positive for cough. Negative for shortness of breath and wheezing.    Gastrointestinal:  Negative for abdominal pain, nausea and vomiting.   Musculoskeletal:  Negative for myalgias.   Neurological:  Negative for headaches.         Current Medications       Current Outpatient Medications:     guaiFENesin-codeine (Guaiatussin AC) 100-10 mg/5 mL oral solution, Take 5 mL by mouth 3 (three) times a day as needed for cough, Disp: 180 mL, Rfl: 0    olmesartan (BENICAR) 5 mg tablet, Take 2 tablets (10 mg total) by mouth daily, Disp: 180 tablet, Rfl: 1    multivitamin (THERAGRAN) TABS, Take 1 tablet by mouth as needed When patient remembers to take them, Disp: , Rfl:     Current Allergies     Allergies as of 2025 - Reviewed 2025   Allergen Reaction Noted    Prednisone Hypertension 2020    Prednisone Palpitations 2023            The following portions of the patient's history were reviewed and updated as appropriate: allergies, current medications, past family history, past medical history, past social history, past surgical history and problem list.     Past Medical History:   Diagnosis Date    Hypertension     Polycystic kidney disease        Past Surgical History:   Procedure Laterality Date     SECTION      MAMMO (HISTORICAL)  2017       Family History   Problem Relation Age of Onset    Hypertension Mother     Other Mother         kidney stone    Polycystic kidney disease Mother     Hypertension Father     Kidney disease Maternal Grandmother     Other Maternal Grandmother         Kidney stone    Brain cancer Paternal Grandfather         unknown age    No Known Problems Sister     No Known Problems Daughter     No Known Problems Son     No Known Problems Brother     No Known Problems Brother          Medications have been verified.        Objective   BP (!) 190/110   Pulse 73   Temp 98.8 °F (37.1 °C)   Resp 16   SpO2 98%    No LMP recorded. Patient is premenopausal.       Physical Exam     Physical Exam  Constitutional:       General: She is not in acute distress.     Appearance: Normal appearance. She is well-developed. She is not ill-appearing, toxic-appearing or diaphoretic.   HENT:      Head: Normocephalic.      Right Ear: Tympanic membrane, ear canal and external ear normal.      Left Ear: Tympanic membrane, ear canal and external ear normal.      Nose: Congestion and rhinorrhea present.      Mouth/Throat:      Pharynx: No oropharyngeal exudate or posterior oropharyngeal erythema.   Eyes:      General:         Right eye: No discharge.         Left eye: No discharge.   Cardiovascular:      Rate and Rhythm: Normal rate and regular rhythm.      Heart sounds: Normal heart sounds. No murmur heard.  Pulmonary:      Effort: Pulmonary effort is normal. No respiratory distress.      Breath sounds: Normal breath sounds. No stridor. No wheezing, rhonchi or rales.      Comments: Breathing easy.  No cough noted.   Chest:      Chest wall: No tenderness.   Musculoskeletal:      Cervical back: Neck supple.   Lymphadenopathy:      Cervical: No cervical adenopathy.   Skin:     General: Skin is warm and dry.   Neurological:      Mental Status: She is alert and oriented to person, place, and time.      Deep Tendon Reflexes: Reflexes are normal and symmetric.   Psychiatric:         Mood and Affect: Mood normal.

## 2025-04-10 LAB
FLUAV RNA RESP QL NAA+PROBE: NEGATIVE
FLUBV RNA RESP QL NAA+PROBE: NEGATIVE
SARS-COV-2 RNA RESP QL NAA+PROBE: NEGATIVE

## 2025-04-10 NOTE — PROGRESS NOTES
04/10/25 10:46 AM    Patient was called after the Urgent Care visit ; a message was left for the patient to return the call    Sending an FYI to clinical Staff patients BP was 190/110                                                                                221/126        Thank you.  Annie Galloway MA  [facility]

## 2025-05-04 DIAGNOSIS — I10 ESSENTIAL HYPERTENSION: ICD-10-CM

## 2025-05-05 RX ORDER — OLMESARTAN MEDOXOMIL 5 MG/1
10 TABLET ORAL DAILY
Qty: 180 TABLET | Refills: 1 | Status: SHIPPED | OUTPATIENT
Start: 2025-05-05

## 2025-06-06 ENCOUNTER — TELEPHONE (OUTPATIENT)
Dept: NEPHROLOGY | Facility: HOSPITAL | Age: 50
End: 2025-06-06

## 2025-06-06 DIAGNOSIS — E55.9 VITAMIN D DEFICIENCY: ICD-10-CM

## 2025-06-06 DIAGNOSIS — N20.0 NEPHROLITHIASIS: ICD-10-CM

## 2025-06-06 DIAGNOSIS — I15.1 HYPERTENSION SECONDARY TO OTHER RENAL DISORDERS: Primary | ICD-10-CM

## 2025-06-06 DIAGNOSIS — Q61.3 POLYCYSTIC KIDNEY DISEASE: ICD-10-CM

## 2025-06-12 ENCOUNTER — OFFICE VISIT (OUTPATIENT)
Dept: NEPHROLOGY | Facility: CLINIC | Age: 50
End: 2025-06-12
Payer: COMMERCIAL

## 2025-06-12 VITALS
SYSTOLIC BLOOD PRESSURE: 160 MMHG | DIASTOLIC BLOOD PRESSURE: 94 MMHG | WEIGHT: 168 LBS | BODY MASS INDEX: 26.37 KG/M2 | HEIGHT: 67 IN | HEART RATE: 80 BPM

## 2025-06-12 DIAGNOSIS — Q61.3 POLYCYSTIC KIDNEY DISEASE: ICD-10-CM

## 2025-06-12 DIAGNOSIS — I10 HYPERTENSION, UNSPECIFIED TYPE: Primary | ICD-10-CM

## 2025-06-12 DIAGNOSIS — I10 ESSENTIAL HYPERTENSION: ICD-10-CM

## 2025-06-12 DIAGNOSIS — N20.0 NEPHROLITHIASIS: ICD-10-CM

## 2025-06-12 PROCEDURE — 99214 OFFICE O/P EST MOD 30 MIN: CPT | Performed by: INTERNAL MEDICINE

## 2025-06-12 RX ORDER — OLMESARTAN MEDOXOMIL 20 MG/1
20 TABLET ORAL DAILY
Qty: 90 TABLET | Refills: 3 | Status: SHIPPED | OUTPATIENT
Start: 2025-06-12

## 2025-06-12 NOTE — ASSESSMENT & PLAN NOTE
History of nephrolithiasis which is more common in patients with polycystic kidney disease in the general population.  She has not had an event but we are going to do an ultrasound to see if there is any stones that are present and also assess the cyst at her request.    Orders:    US kidney and bladder with pvr; Future

## 2025-06-12 NOTE — PATIENT INSTRUCTIONS
You are here for follow-up.  I am glad to hear you are doing well except it does sound like life is full of a lot of activity and stress but you really look great.  Your blood pressure seems to be running higher and certainly stress can play a role but you are on a low-dose of olmesartan and I did tell you it is not you it is the polycystic kidney disease it causes the blood pressure.  Saying that lets increase your olmesartan to 20 mg a day and monitor your blood pressure if you feel you get any side effects call me.    Will get an ultrasound to evaluate cysts and stones if any are present.    Your blood work from November showed totally normal kidney function your creatinine was 0.6 are still working great.    Increase olmesartan to 20 mg a day  Get ultrasound done I will call you with results  After you have been on the olmesartan higher dose for at least a couple weeks get the blood work done that I have put in and I will call you and we will see how your blood pressure is doing and make sure the kidney function stable.

## 2025-06-12 NOTE — ASSESSMENT & PLAN NOTE
The patient has polycystic kidney disease.  Her mother had progressed to end-stage renal disease has been on dialysis for about 7 years.  Fortunately for this patient her creatinine remains normal at 0.6 and that was checked last November so she has not really started to have progressive decline in her function shown on lab testing.  She does have hypertension and we are going to adjust medications as above.  I have asked her to repeat blood work in the near future after the increased dose to make sure renal function remains normal.    Orders:    Basic metabolic panel

## 2025-06-12 NOTE — PROGRESS NOTES
Name: Sudha Everett      : 1975      MRN: [id number]  Encounter Provider: Masoud Medel MD  Encounter Date: 2025   Encounter department: [facility] [place]  :  Assessment & Plan  Hypertension, unspecified type    The patient has history of hypertension and I suspect its likely related in part to her polycystic kidney disease as it is very common for patients with this condition to develop high blood pressure.  She has been on olmesartan 10 mg a day and her blood pressure is high in the office and she states it has been running high.  She attributes it partially to a lot of stress in her life but I then discussed with her that it is also related to polycystic kidney.  I did tell her that the current olmesartan dose is very low so were going to increase her to 20 mg a day and follow.    Increase olmesartan to 20 mg a day         Polycystic kidney disease    The patient has polycystic kidney disease.  Her mother had progressed to end-stage renal disease has been on dialysis for about 7 years.  Fortunately for this patient her creatinine remains normal at 0.6 and that was checked last November so she has not really started to have progressive decline in her function shown on lab testing.  She does have hypertension and we are going to adjust medications as above.  I have asked her to repeat blood work in the near future after the increased dose to make sure renal function remains normal.    Orders:    Basic metabolic panel    Nephrolithiasis    History of nephrolithiasis which is more common in patients with polycystic kidney disease in the general population.  She has not had an event but we are going to do an ultrasound to see if there is any stones that are present and also assess the cyst at her request.    Orders:    US kidney and bladder with pvr; Future    I have spent a total time of 30 minutes in caring for this patient on the day of the visit/encounter including  "Diagnostic results, Patient and family education, Importance of tx compliance, Reviewing/placing orders in the medical record (including tests, medications, and/or procedures), and Obtaining or reviewing history  .     History of Present Illness   HPI  Sudha Everett is a 49 y.o. female who presents for routine follow-up after a year.  She states she has been doing well except she is under a lot of stress with work and caring for her daughter and her other health issues.  But other than that she has been doing well no kidney stone event no hospitalizations.  History obtained from: patient    Review of Systems   Constitutional:  Negative for chills and fever.   HENT: Negative.     Eyes: Negative.    Respiratory:  Negative for cough, chest tightness, shortness of breath and wheezing.    Cardiovascular:  Negative for chest pain and leg swelling.   Gastrointestinal:  Negative for abdominal pain, diarrhea, nausea and vomiting.   Genitourinary: Negative.    Neurological:  Negative for dizziness and headaches.   Psychiatric/Behavioral:  Negative for agitation, behavioral problems, confusion and decreased concentration.           Objective   /94 (BP Location: Right arm, Patient Position: Sitting, Cuff Size: Standard)   Pulse 80   Ht 5' 7\" (1.702 m)   Wt 76.2 kg (168 lb)   BMI 26.31 kg/m²      Physical Exam  Constitutional:       General: She is not in acute distress.     Appearance: Normal appearance. She is not ill-appearing or toxic-appearing.   HENT:      Head: Normocephalic and atraumatic.      Nose: Nose normal.      Mouth/Throat:      Mouth: Mucous membranes are moist.     Eyes:      Extraocular Movements: Extraocular movements intact.       Cardiovascular:      Rate and Rhythm: Normal rate and regular rhythm.      Heart sounds:      No gallop.      Comments: No edema.  Pulmonary:      Effort: Pulmonary effort is normal. No respiratory distress.      Breath sounds: No wheezing or rales.   Abdominal:    "   General: There is no distension.      Palpations: Abdomen is soft.      Tenderness: There is no abdominal tenderness.     Neurological:      Mental Status: She is alert and oriented to person, place, and time.     Psychiatric:         Mood and Affect: Mood normal.         Behavior: Behavior normal.

## 2025-06-12 NOTE — LETTER
2025     Amari Vera MD   [facility]  [address]  [facility] [address]    Patient: Sudha Everett   YOB: 1975   Date of Visit: 2025       Dear Dr. Amari Vera MD:    Thank you for referring Sudha Everett to me for evaluation. Below are my notes for this consultation.    If you have questions, please do not hesitate to call me. I look forward to following your patient along with you.         Sincerely,        Masoud Medel MD        CC: No Recipients    Masoud Medel MD  2025  5:30 PM  Sign when Signing Visit  Name: Sudha Everett      : 1975      MRN: [id number]  Encounter Provider: Masoud Medel MD  Encounter Date: 2025   Encounter department: [facility] [place]  :  Assessment & Plan  Hypertension, unspecified type    The patient has history of hypertension and I suspect its likely related in part to her polycystic kidney disease as it is very common for patients with this condition to develop high blood pressure.  She has been on olmesartan 10 mg a day and her blood pressure is high in the office and she states it has been running high.  She attributes it partially to a lot of stress in her life but I then discussed with her that it is also related to polycystic kidney.  I did tell her that the current olmesartan dose is very low so were going to increase her to 20 mg a day and follow.    Increase olmesartan to 20 mg a day         Polycystic kidney disease    The patient has polycystic kidney disease.  Her mother had progressed to end-stage renal disease has been on dialysis for about 7 years.  Fortunately for this patient her creatinine remains normal at 0.6 and that was checked last November so she has not really started to have progressive decline in her function shown on lab testing.  She does have hypertension and we are going to adjust medications as above.  I have asked her to repeat blood work in the near future after  "the increased dose to make sure renal function remains normal.    Orders:  •  Basic metabolic panel    Nephrolithiasis    History of nephrolithiasis which is more common in patients with polycystic kidney disease in the general population.  She has not had an event but we are going to do an ultrasound to see if there is any stones that are present and also assess the cyst at her request.    Orders:  •  US kidney and bladder with pvr; Future    I have spent a total time of 30 minutes in caring for this patient on the day of the visit/encounter including Diagnostic results, Patient and family education, Importance of tx compliance, Reviewing/placing orders in the medical record (including tests, medications, and/or procedures), and Obtaining or reviewing history  .     History of Present Illness  HPI  Sudha Everett is a 49 y.o. female who presents for routine follow-up after a year.  She states she has been doing well except she is under a lot of stress with work and caring for her daughter and her other health issues.  But other than that she has been doing well no kidney stone event no hospitalizations.  History obtained from: patient    Review of Systems   Constitutional:  Negative for chills and fever.   HENT: Negative.     Eyes: Negative.    Respiratory:  Negative for cough, chest tightness, shortness of breath and wheezing.    Cardiovascular:  Negative for chest pain and leg swelling.   Gastrointestinal:  Negative for abdominal pain, diarrhea, nausea and vomiting.   Genitourinary: Negative.    Neurological:  Negative for dizziness and headaches.   Psychiatric/Behavioral:  Negative for agitation, behavioral problems, confusion and decreased concentration.           Objective  /94 (BP Location: Right arm, Patient Position: Sitting, Cuff Size: Standard)   Pulse 80   Ht 5' 7\" (1.702 m)   Wt 76.2 kg (168 lb)   BMI 26.31 kg/m²      Physical Exam  Constitutional:       General: She is not in acute " distress.     Appearance: Normal appearance. She is not ill-appearing or toxic-appearing.   HENT:      Head: Normocephalic and atraumatic.      Nose: Nose normal.      Mouth/Throat:      Mouth: Mucous membranes are moist.     Eyes:      Extraocular Movements: Extraocular movements intact.       Cardiovascular:      Rate and Rhythm: Normal rate and regular rhythm.      Heart sounds:      No gallop.      Comments: No edema.  Pulmonary:      Effort: Pulmonary effort is normal. No respiratory distress.      Breath sounds: No wheezing or rales.   Abdominal:      General: There is no distension.      Palpations: Abdomen is soft.      Tenderness: There is no abdominal tenderness.     Neurological:      Mental Status: She is alert and oriented to person, place, and time.     Psychiatric:         Mood and Affect: Mood normal.         Behavior: Behavior normal.

## 2025-06-12 NOTE — ASSESSMENT & PLAN NOTE
The patient has history of hypertension and I suspect its likely related in part to her polycystic kidney disease as it is very common for patients with this condition to develop high blood pressure.  She has been on olmesartan 10 mg a day and her blood pressure is high in the office and she states it has been running high.  She attributes it partially to a lot of stress in her life but I then discussed with her that it is also related to polycystic kidney.  I did tell her that the current olmesartan dose is very low so were going to increase her to 20 mg a day and follow.    Increase olmesartan to 20 mg a day

## 2025-06-17 ENCOUNTER — ANNUAL EXAM (OUTPATIENT)
Dept: OBGYN CLINIC | Facility: CLINIC | Age: 50
End: 2025-06-17
Payer: COMMERCIAL

## 2025-06-17 VITALS
SYSTOLIC BLOOD PRESSURE: 142 MMHG | HEIGHT: 67 IN | WEIGHT: 166 LBS | BODY MASS INDEX: 26.06 KG/M2 | DIASTOLIC BLOOD PRESSURE: 74 MMHG

## 2025-06-17 DIAGNOSIS — N84.1 CERVICAL POLYP: ICD-10-CM

## 2025-06-17 DIAGNOSIS — Z12.4 ENCOUNTER FOR SCREENING FOR MALIGNANT NEOPLASM OF CERVIX: ICD-10-CM

## 2025-06-17 DIAGNOSIS — R22.32 LEFT AXILLARY FULLNESS: ICD-10-CM

## 2025-06-17 DIAGNOSIS — Z01.419 WELL WOMAN EXAM WITH ROUTINE GYNECOLOGICAL EXAM: ICD-10-CM

## 2025-06-17 DIAGNOSIS — Z12.39 ENCOUNTER FOR SCREENING BREAST EXAMINATION: ICD-10-CM

## 2025-06-17 DIAGNOSIS — Z11.51 SCREENING FOR HPV (HUMAN PAPILLOMAVIRUS): ICD-10-CM

## 2025-06-17 DIAGNOSIS — N81.4 UTERINE PROLAPSE: ICD-10-CM

## 2025-06-17 DIAGNOSIS — Z01.419 ENCOUNTER FOR WELL WOMAN EXAM: ICD-10-CM

## 2025-06-17 DIAGNOSIS — Z12.4 CERVICAL CANCER SCREENING: ICD-10-CM

## 2025-06-17 PROCEDURE — G0476 HPV COMBO ASSAY CA SCREEN: HCPCS | Performed by: OBSTETRICS & GYNECOLOGY

## 2025-06-17 PROCEDURE — 57500 BIOPSY OF CERVIX: CPT | Performed by: OBSTETRICS & GYNECOLOGY

## 2025-06-17 PROCEDURE — S0612 ANNUAL GYNECOLOGICAL EXAMINA: HCPCS | Performed by: OBSTETRICS & GYNECOLOGY

## 2025-06-17 PROCEDURE — G0145 SCR C/V CYTO,THINLAYER,RESCR: HCPCS | Performed by: OBSTETRICS & GYNECOLOGY

## 2025-06-17 PROCEDURE — 88305 TISSUE EXAM BY PATHOLOGIST: CPT | Performed by: PATHOLOGY

## 2025-06-17 NOTE — PROGRESS NOTES
"ASSESSMENT & PLAN:   Diagnoses and all orders for this visit:    Encounter for screening for malignant neoplasm of cervix  -     Liquid-based pap, screening    Screening for HPV (human papillomavirus)  -     Liquid-based pap, screening    Well woman exam with routine gynecological exam  -     Liquid-based pap, screening    Encounter for well woman exam    Encounter for screening breast examination    Cervical cancer screening    Uterine prolapse  -     Ambulatory Referral to Urogynecology; Future    Left axillary fullness  -     Mammo diagnostic left w 3d and cad; Future  -     US Breast Axilla Left; Future    Cervical polyp  -     Tissue Exam      Discussed pelvic organ prolapse and treatment with PFPT, pessary vs surgery.   She would like to see urogynecology to discuss surgical procedures   She is still sexually active. Understands PFPT may help but will not fix the current issue       The following were reviewed in today's visit: ASCCP guidelines, breast self exam, mammography screening ordered, menopause, adequate intake of calcium and vitamin D, exercise, and healthy diet.    Patient to return to office in yearly for annual exam.     All questions have been answered to her satisfaction.        CC:  Annual Gynecologic Examination  Chief Complaint   Patient presents with    Gynecologic Exam     Last pap 2022 neg pap/ neg hpv   Last mammo 2024 neg-repeat 1 yr   Cologuard 12/15/2022-Negative          HPI: Sudha Everett is a 49 y.o.  who presents for annual gynecologic examination.  She has the following concerns:      Pressure with sitting or standing   Feels like something is \"coming out\".   She works at [employer] and does do lifting.   Does not have bleeding or pain.   Had 1  8# and 1 CS.   No bleeding since menopause 2years ago     Does report some fullness in left axilla over the last year   Has had normal mammos in the past.   No pain just feels full     Health Maintenance:  "   Exercise: daily  Breast exams/breast awareness: no  Last mammogram:   Colorectal cancer screenin    Past Medical History[1]    Past Surgical History[2]    Past OB/Gyn History:   Patient's last menstrual period was 2023 (approximate).    Menopausal status: postmenopausal  Menopausal symptoms: None    Last Pap:  : no abnormalities  History of abnormal Pap smear: no    Patient is currently sexually active.   STD testing: no  Current contraception: post menopausal status      Family History  Family History[3]    Family history of uterine or ovarian cancer: no  Family history of breast cancer: no  Family history of colon cancer: no    Social History:  Social History     Socioeconomic History    Marital status: /Civil Union     Spouse name: Not on file    Number of children: Not on file    Years of education: Not on file    Highest education level: 12th grade   Occupational History    Occupation: Retail    Tobacco Use    Smoking status: Never    Smokeless tobacco: Never   Vaping Use    Vaping status: Never Used   Substance and Sexual Activity    Alcohol use: Yes     Comment: occasionally    Drug use: No    Sexual activity: Yes     Partners: Male     Birth control/protection: Condom Male, Post-menopausal   Other Topics Concern    Not on file   Social History Narrative    ** Merged History Encounter **         Do you currently or have you served in the [employer]: No  Were you activated, into active duty, as a member of the [employer] or as a Reservist: No  Occupation:  retail store  Education: 12  Marital status:   Sexual orientatio    n: Heterosexual  Exercise level: Moderate  Diet: Regular  General stress level: Medium  Alcohol intake: Moderate  Caffeine intake: Moderate  Chewing tobacco: none  Illicit drugs: none  Seat belts used routinely: Yes  Sunscreen used routinely: Yes  Smoke     alarm in home: Yes  Advance directive: No  Salt Intake: minimal  Has the  "Patient had a mammogram to screen for breast cancer within 24 months: No     Social Drivers of Health     Financial Resource Strain: Not on file   Food Insecurity: Not on file   Transportation Needs: Not on file   Physical Activity: Not on file   Stress: Not on file   Social Connections: Not on file   Intimate Partner Violence: Not on file   Housing Stability: Not on file     Domestic violence screen: negative    Allergies:  Allergies[4]    Medications:  Current Medications[5]    Review of Systems:  Review of Systems   Constitutional:  Negative for chills and fever.   HENT: Negative.     Eyes:  Negative for visual disturbance.   Respiratory:  Negative for shortness of breath.    Cardiovascular:  Negative for chest pain.   Gastrointestinal:  Negative for abdominal pain, nausea and vomiting.   Endocrine: Negative.    Genitourinary:  Negative for difficulty urinating, dyspareunia, vaginal bleeding, vaginal discharge and vaginal pain.   Musculoskeletal:  Negative for back pain.   Skin:  Negative for color change and rash.   Allergic/Immunologic: Negative.    Neurological:  Negative for dizziness and light-headedness.   Psychiatric/Behavioral: Negative.           Physical Exam:  /74 (BP Location: Left arm, Patient Position: Sitting, Cuff Size: Standard)   Ht 5' 7\" (1.702 m)   Wt 75.3 kg (166 lb)   LMP 05/01/2023 (Approximate)   BMI 26.00 kg/m²    Physical Exam  Constitutional:       General: She is not in acute distress.     Appearance: Normal appearance. She is not ill-appearing, toxic-appearing or diaphoretic.   Genitourinary:      Urethral meatus normal.      No lesions in the vagina.      Genitourinary Comments: Pap collected   Cervical polyp noted- removed with ringed forcep without difficulty   Sent to pathology       Significant apical and anterior prolapse to the introitus with vasalva       Fullness noted in the left axilla  No tenderness to palpation       Right Labia: No rash, tenderness, lesions, " skin changes or Bartholin's cyst.     Left Labia: No tenderness, lesions, skin changes, Bartholin's cyst or rash.     No labial fusion noted.      No vaginal discharge, erythema, tenderness, bleeding or ulceration.      Anterior and apical vaginal prolapse present.     Moderate vaginal atrophy present.       Right Adnexa: not tender, not full, not palpable, no mass present and not absent.     Left Adnexa: not tender, not full, not palpable, no mass present and not absent.     Cervix is parous.      Cervix is not nulliparous or absent.      Cervical polyp present.      No cervical motion tenderness, discharge, friability, lesion, nabothian cyst, eversion or elongation.            No parametrium nodularity or thickening present.     Uterus is prolapsed.      Uterus is not enlarged, fixed, tender or irregular.      No uterine mass detected.     Uterus is anteverted.      Uterus is not absent.     No urethral tenderness present.      Pelvic exam was performed with patient in the lithotomy position.   Breasts:     Breasts are symmetrical.      Breasts are soft.     Right: Normal. No swelling, bleeding, inverted nipple, mass, nipple discharge, skin change or tenderness.      Left: No swelling, bleeding, inverted nipple, mass, nipple discharge, skin change or tenderness.   HENT:      Head: Normocephalic and atraumatic.     Cardiovascular:      Rate and Rhythm: Normal rate and regular rhythm.      Pulses: Normal pulses.   Pulmonary:      Effort: Pulmonary effort is normal. No respiratory distress.      Breath sounds: Normal breath sounds. No stridor. No wheezing or rhonchi.   Chest:      Chest wall: No tenderness.     Abdominal:      General: Abdomen is flat. There is no distension.      Palpations: Abdomen is soft. There is no mass.      Tenderness: There is no abdominal tenderness. There is no guarding or rebound.      Hernia: No hernia is present.     Musculoskeletal:         General: Normal range of motion.       Cervical back: Normal range of motion.      Right lower leg: No edema.      Left lower leg: No edema.   Lymphadenopathy:      Upper Body:      Right upper body: No supraclavicular or axillary adenopathy.      Left upper body: Axillary adenopathy present. No supraclavicular adenopathy.     Neurological:      General: No focal deficit present.      Mental Status: She is alert. Mental status is at baseline.     Skin:     General: Skin is warm and dry.     Psychiatric:         Mood and Affect: Mood normal.         Behavior: Behavior normal.         Thought Content: Thought content normal.         Judgment: Judgment normal.                                     [1]   Past Medical History:  Diagnosis Date    Hypertension     Polycystic kidney disease    [2]   Past Surgical History:  Procedure Laterality Date     SECTION      MAMMO (HISTORICAL)     [3]   Family History  Problem Relation Name Age of Onset    Hypertension Mother      Other Mother          kidney stone    Polycystic kidney disease Mother      Hypertension Father      Kidney disease Maternal Grandmother      Other Maternal Grandmother          Kidney stone    Brain cancer Paternal Grandfather          unknown age    No Known Problems Sister      No Known Problems Daughter      No Known Problems Son      No Known Problems Brother      No Known Problems Brother     [4]   Allergies  Allergen Reactions    Prednisone Hypertension    Prednisone Palpitations   [5]   Current Outpatient Medications:     multivitamin (THERAGRAN) TABS, Take 1 tablet by mouth as needed When patient remembers to take them, Disp: , Rfl:     olmesartan (BENICAR) 20 mg tablet, Take 1 tablet (20 mg total) by mouth daily, Disp: 90 tablet, Rfl: 3    guaiFENesin-codeine (Guaiatussin AC) 100-10 mg/5 mL oral solution, Take 5 mL by mouth 3 (three) times a day as needed for cough (Patient not taking: Reported on 2025), Disp: 180 mL, Rfl: 0

## 2025-06-23 PROCEDURE — 88305 TISSUE EXAM BY PATHOLOGIST: CPT | Performed by: PATHOLOGY

## 2025-06-24 LAB
LAB AP GYN PRIMARY INTERPRETATION: NORMAL
Lab: NORMAL